# Patient Record
Sex: FEMALE | Race: WHITE | NOT HISPANIC OR LATINO | Employment: FULL TIME | URBAN - METROPOLITAN AREA
[De-identification: names, ages, dates, MRNs, and addresses within clinical notes are randomized per-mention and may not be internally consistent; named-entity substitution may affect disease eponyms.]

---

## 2023-02-25 PROBLEM — R39.9 UTI SYMPTOMS: Status: ACTIVE | Noted: 2023-02-25

## 2023-02-25 PROBLEM — M79.609 POPLITEAL PAIN: Status: ACTIVE | Noted: 2023-02-25

## 2023-02-25 PROBLEM — M25.562 LEFT KNEE PAIN: Status: ACTIVE | Noted: 2023-02-25

## 2023-02-25 PROBLEM — S83.519D: Status: ACTIVE | Noted: 2023-02-25

## 2023-02-25 PROBLEM — S89.92XA INJURY OF LEFT LEG: Status: ACTIVE | Noted: 2023-02-25

## 2023-02-25 PROBLEM — M79.672 LEFT FOOT PAIN: Status: ACTIVE | Noted: 2023-02-25

## 2023-02-25 RX ORDER — NORGESTIMATE AND ETHINYL ESTRADIOL 7DAYSX3 LO
KIT ORAL
COMMUNITY
End: 2023-04-06 | Stop reason: ALTCHOICE

## 2023-04-05 PROBLEM — E78.5 HYPERLIPIDEMIA: Status: ACTIVE | Noted: 2023-04-05

## 2023-04-05 ASSESSMENT — ENCOUNTER SYMPTOMS
SWEATS: 0
PALPITATIONS: 0
HYPERTENSION: 1
SHORTNESS OF BREATH: 0
ORTHOPNEA: 0
NECK PAIN: 0
BLURRED VISION: 0
HEADACHES: 0
PND: 0

## 2023-04-06 ENCOUNTER — OFFICE VISIT (OUTPATIENT)
Dept: PRIMARY CARE | Facility: CLINIC | Age: 23
End: 2023-04-06
Payer: COMMERCIAL

## 2023-04-06 VITALS
BODY MASS INDEX: 35.44 KG/M2 | OXYGEN SATURATION: 97 % | SYSTOLIC BLOOD PRESSURE: 116 MMHG | RESPIRATION RATE: 17 BRPM | WEIGHT: 240 LBS | HEART RATE: 68 BPM | TEMPERATURE: 97.7 F | DIASTOLIC BLOOD PRESSURE: 74 MMHG

## 2023-04-06 DIAGNOSIS — E78.2 MIXED HYPERLIPIDEMIA: ICD-10-CM

## 2023-04-06 DIAGNOSIS — E66.9 OBESITY (BMI 35.0-39.9 WITHOUT COMORBIDITY): ICD-10-CM

## 2023-04-06 DIAGNOSIS — Z30.09 ENCOUNTER FOR OTHER GENERAL COUNSELING OR ADVICE ON CONTRACEPTION: Primary | ICD-10-CM

## 2023-04-06 PROBLEM — Z30.9 CONTRACEPTIVE MANAGEMENT: Status: ACTIVE | Noted: 2023-04-06

## 2023-04-06 PROCEDURE — 99214 OFFICE O/P EST MOD 30 MIN: CPT | Performed by: INTERNAL MEDICINE

## 2023-04-06 PROCEDURE — 3008F BODY MASS INDEX DOCD: CPT | Performed by: INTERNAL MEDICINE

## 2023-04-06 PROCEDURE — 1036F TOBACCO NON-USER: CPT | Performed by: INTERNAL MEDICINE

## 2023-04-06 RX ORDER — NORGESTIMATE AND ETHINYL ESTRADIOL 7DAYSX3 28
1 KIT ORAL DAILY
Qty: 28 TABLET | Refills: 12 | Status: SHIPPED | OUTPATIENT
Start: 2023-04-06 | End: 2024-04-05

## 2023-04-06 ASSESSMENT — ENCOUNTER SYMPTOMS
TROUBLE SWALLOWING: 0
WHEEZING: 0
CONSTITUTIONAL NEGATIVE: 1
DIFFICULTY URINATING: 0
VOICE CHANGE: 0
CARDIOVASCULAR NEGATIVE: 1
SWEATS: 0
EYES NEGATIVE: 1
MYALGIAS: 0
CONSTIPATION: 0
DIARRHEA: 0
CONFUSION: 0
APPETITE CHANGE: 0
PSYCHIATRIC NEGATIVE: 1
HEADACHES: 0
HEMATOLOGIC/LYMPHATIC NEGATIVE: 1
EYE PAIN: 0
BACK PAIN: 0
COLOR CHANGE: 0
PND: 0
POLYDIPSIA: 0
MUSCULOSKELETAL NEGATIVE: 1
SORE THROAT: 0
ARTHRALGIAS: 0
NAUSEA: 0
DEPRESSION: 0
SLEEP DISTURBANCE: 0
SEIZURES: 0
FREQUENCY: 0
FLANK PAIN: 0
EYE REDNESS: 0
HYPERTENSION: 1
SHORTNESS OF BREATH: 0
SINUS PRESSURE: 0
AGITATION: 0
ENDOCRINE NEGATIVE: 1
BRUISES/BLEEDS EASILY: 0
DYSURIA: 0
NEUROLOGICAL NEGATIVE: 1
TREMORS: 0
HALLUCINATIONS: 0
EYE DISCHARGE: 0
VOMITING: 0
BLURRED VISION: 0
RESPIRATORY NEGATIVE: 1
ADENOPATHY: 0
WOUND: 0
COUGH: 0
PALPITATIONS: 0
UNEXPECTED WEIGHT CHANGE: 0
ALLERGIC/IMMUNOLOGIC NEGATIVE: 1
DIZZINESS: 0
WEAKNESS: 0
NECK PAIN: 0
POLYPHAGIA: 0
ORTHOPNEA: 0
ABDOMINAL PAIN: 0
NUMBNESS: 0
NECK STIFFNESS: 0
CHEST TIGHTNESS: 0
BLOOD IN STOOL: 0
JOINT SWELLING: 0
LIGHT-HEADEDNESS: 0
LOSS OF SENSATION IN FEET: 0
ACTIVITY CHANGE: 0
SINUS PAIN: 0
STRIDOR: 0
SPEECH DIFFICULTY: 0
FACIAL ASYMMETRY: 0
NERVOUS/ANXIOUS: 0
GASTROINTESTINAL NEGATIVE: 1
OCCASIONAL FEELINGS OF UNSTEADINESS: 0

## 2023-04-06 ASSESSMENT — PATIENT HEALTH QUESTIONNAIRE - PHQ9
SUM OF ALL RESPONSES TO PHQ9 QUESTIONS 1 AND 2: 0
2. FEELING DOWN, DEPRESSED OR HOPELESS: NOT AT ALL
1. LITTLE INTEREST OR PLEASURE IN DOING THINGS: NOT AT ALL

## 2023-04-06 ASSESSMENT — PAIN SCALES - GENERAL: PAINLEVEL: 0-NO PAIN

## 2023-04-06 NOTE — PROGRESS NOTES
Subjective   Patient ID: Violet M Romanak is a 23 y.o. female who presents for Follow-up (The patient is here for a follow up for weight loss and birth control. ).  Hypertension  This is a recurrent problem. The current episode started more than 1 year ago. The problem has been gradually improving since onset. The problem is controlled. Pertinent negatives include no anxiety, blurred vision, chest pain, headaches, malaise/fatigue, neck pain, orthopnea, palpitations, peripheral edema, PND, shortness of breath or sweats. Agents associated with hypertension include oral contraceptives. Risk factors for coronary artery disease include dyslipidemia, obesity and stress. There are no compliance problems.        Patient has been feeling pretty good and has been complaint with prescribed medications.    We reviewed and discussed details of recent blood work: CBC, CMP, TSH, Lipid panel done in 2022.  Results within acceptable range.  Mildly elevated cholesterol noted.    Her PAP test in Jan 2023 was negative.     She lost 6 lbs.   Saw dietician, has been following healthy diet and exercising daily.  We discussed low carb diet.    Requests refill on BPP.  I gave patient counseling on BCP risks including DVT, PE, not using tobacco products when taking BC due to increased clotting risk.     She was not aware about those, BCP previosly prescribed by GYN.    Review of Systems   Constitutional: Negative.  Negative for activity change, appetite change, malaise/fatigue and unexpected weight change.   HENT: Negative.  Negative for congestion, ear discharge, ear pain, hearing loss, mouth sores, nosebleeds, sinus pressure, sinus pain, sore throat, trouble swallowing and voice change.    Eyes: Negative.  Negative for blurred vision, pain, discharge, redness and visual disturbance.   Respiratory: Negative.  Negative for cough, chest tightness, shortness of breath, wheezing and stridor.    Cardiovascular: Negative.  Negative for chest  pain, palpitations, orthopnea, leg swelling and PND.   Gastrointestinal: Negative.  Negative for abdominal pain, blood in stool, constipation, diarrhea, nausea and vomiting.   Endocrine: Negative.  Negative for polydipsia, polyphagia and polyuria.   Genitourinary: Negative.  Negative for difficulty urinating, dysuria, flank pain, frequency and urgency.   Musculoskeletal: Negative.  Negative for arthralgias, back pain, gait problem, joint swelling, myalgias, neck pain and neck stiffness.   Skin: Negative.  Negative for color change, rash and wound.   Allergic/Immunologic: Negative.  Negative for environmental allergies, food allergies and immunocompromised state.   Neurological: Negative.  Negative for dizziness, tremors, seizures, syncope, facial asymmetry, speech difficulty, weakness, light-headedness, numbness and headaches.   Hematological: Negative.  Negative for adenopathy. Does not bruise/bleed easily.   Psychiatric/Behavioral: Negative.  Negative for agitation, behavioral problems, confusion, hallucinations, sleep disturbance and suicidal ideas. The patient is not nervous/anxious.    All other systems reviewed and are negative.      Objective     Review of systems was performed and all systems were negative except what in HPI    /74 (BP Location: Left arm, Patient Position: Sitting)   Pulse 68   Temp 36.5 °C (97.7 °F)   Resp 17   Wt 109 kg (240 lb)   SpO2 97%   BMI 35.44 kg/m²    Physical Exam  Vitals and nursing note reviewed.   Constitutional:       General: She is not in acute distress.     Appearance: Normal appearance.   HENT:      Head: Normocephalic and atraumatic.      Right Ear: External ear normal.      Left Ear: External ear normal.      Nose: Nose normal. No congestion or rhinorrhea.   Eyes:      General:         Right eye: No discharge.         Left eye: No discharge.      Extraocular Movements: Extraocular movements intact.      Conjunctiva/sclera: Conjunctivae normal.      Pupils:  Pupils are equal, round, and reactive to light.   Cardiovascular:      Rate and Rhythm: Normal rate and regular rhythm.      Pulses: Normal pulses.      Heart sounds: Normal heart sounds. No murmur heard.     No friction rub. No gallop.   Pulmonary:      Effort: Pulmonary effort is normal. No respiratory distress.      Breath sounds: Normal breath sounds. No stridor. No wheezing, rhonchi or rales.   Chest:      Chest wall: No tenderness.   Abdominal:      General: Bowel sounds are normal.      Palpations: Abdomen is soft. There is no mass.      Tenderness: There is no abdominal tenderness. There is no guarding or rebound.   Musculoskeletal:         General: No swelling or deformity. Normal range of motion.      Cervical back: Normal range of motion and neck supple. No rigidity or tenderness.      Right lower leg: No edema.      Left lower leg: No edema.   Lymphadenopathy:      Cervical: No cervical adenopathy.   Skin:     General: Skin is warm and dry.      Coloration: Skin is not jaundiced.      Findings: No bruising or erythema.   Neurological:      General: No focal deficit present.      Mental Status: She is alert and oriented to person, place, and time. Mental status is at baseline.      Cranial Nerves: No cranial nerve deficit.      Motor: No weakness.      Coordination: Coordination normal.      Gait: Gait normal.   Psychiatric:         Mood and Affect: Mood normal.         Behavior: Behavior normal.         Thought Content: Thought content normal.         Judgment: Judgment normal.         Assessment/Plan   Problem List Items Addressed This Visit          Endocrine/Metabolic    Obesity (BMI 35.0-39.9 without comorbidity)     Weight loss is advised. Target BMI: 25. Please follow low carbohydrate diet and exercise at least 150 minutes weekly.  Nutritional consultation is available, please let me know if interested.             Other    Hyperlipidemia     Low cholesterol diet is advised.          Contraceptive  management - Primary    Relevant Medications    norgestimate-ethinyl estradioL (Ortho Tri-Cyclen,Trinessa) 0.18/0.215/0.25 mg-35 mcg (28) tablet     It was a pleasure to see you today.  I would like to remind you about importance of a healthy lifestyle in order to improve your well-being and live longer.  Try to engage in physical activities for at least 150 minutes per week.  Eat about 10 servings of fruits and vegetables daily. My advice is 2 servings of fruits and 8 servings of vegetables.  For vegetables choose at least half of them green and at least half of them fresh.  Please avoid sugar, salt, fried food and saturated fat.    I spent a total of 30 minutes on the date of service which included preparing to see the patient, face-to-face patient care, completing clinical documentation, obtaining and/or reviewing separately obtained history, performing a medically appropriate examination, counseling and educating the patient/family/caregiver, ordering medications, tests, or procedures, communicating with other health care providers (not separately reported), independently interpreting results (not separately reported), communicating results to the patient/family/caregiver, and care coordination (not separately reported).    F/up in 6 months or sooner if needed.

## 2023-04-07 ENCOUNTER — TELEPHONE (OUTPATIENT)
Dept: PRIMARY CARE | Facility: CLINIC | Age: 23
End: 2023-04-07
Payer: COMMERCIAL

## 2023-04-07 NOTE — TELEPHONE ENCOUNTER
Pt saw Dr. Bhat yesterday and forgot to mention she is going to [place] in about a month and wanted to know if there are any vaccines she will need to get. Please advise

## 2023-04-27 ENCOUNTER — CLINICAL SUPPORT (OUTPATIENT)
Dept: PRIMARY CARE | Facility: CLINIC | Age: 23
End: 2023-04-27
Payer: COMMERCIAL

## 2023-04-27 DIAGNOSIS — Z23 IMMUNIZATION DUE: ICD-10-CM

## 2023-04-27 PROCEDURE — 90715 TDAP VACCINE 7 YRS/> IM: CPT | Performed by: INTERNAL MEDICINE

## 2023-04-27 PROCEDURE — 90471 IMMUNIZATION ADMIN: CPT | Performed by: INTERNAL MEDICINE

## 2023-10-06 ENCOUNTER — OFFICE VISIT (OUTPATIENT)
Dept: PRIMARY CARE | Facility: CLINIC | Age: 23
End: 2023-10-06
Payer: COMMERCIAL

## 2023-10-06 VITALS
HEART RATE: 78 BPM | SYSTOLIC BLOOD PRESSURE: 126 MMHG | WEIGHT: 233 LBS | RESPIRATION RATE: 16 BRPM | TEMPERATURE: 97.3 F | DIASTOLIC BLOOD PRESSURE: 86 MMHG | OXYGEN SATURATION: 98 % | BODY MASS INDEX: 34.51 KG/M2 | HEIGHT: 69 IN

## 2023-10-06 DIAGNOSIS — Z00.00 HEALTHCARE MAINTENANCE: ICD-10-CM

## 2023-10-06 DIAGNOSIS — Z30.09 ENCOUNTER FOR OTHER GENERAL COUNSELING OR ADVICE ON CONTRACEPTION: ICD-10-CM

## 2023-10-06 DIAGNOSIS — E66.09 CLASS 1 OBESITY DUE TO EXCESS CALORIES WITHOUT SERIOUS COMORBIDITY WITH BODY MASS INDEX (BMI) OF 34.0 TO 34.9 IN ADULT: ICD-10-CM

## 2023-10-06 DIAGNOSIS — E78.2 MIXED HYPERLIPIDEMIA: ICD-10-CM

## 2023-10-06 DIAGNOSIS — Z20.2 POSSIBLE EXPOSURE TO STD: Primary | ICD-10-CM

## 2023-10-06 PROBLEM — E66.811 CLASS 1 OBESITY DUE TO EXCESS CALORIES WITHOUT SERIOUS COMORBIDITY WITH BODY MASS INDEX (BMI) OF 34.0 TO 34.9 IN ADULT: Status: ACTIVE | Noted: 2023-10-06

## 2023-10-06 PROCEDURE — 1036F TOBACCO NON-USER: CPT | Performed by: INTERNAL MEDICINE

## 2023-10-06 PROCEDURE — 99214 OFFICE O/P EST MOD 30 MIN: CPT | Performed by: INTERNAL MEDICINE

## 2023-10-06 PROCEDURE — 3008F BODY MASS INDEX DOCD: CPT | Performed by: INTERNAL MEDICINE

## 2023-10-06 ASSESSMENT — ENCOUNTER SYMPTOMS
COUGH: 0
LIGHT-HEADEDNESS: 0
ABDOMINAL PAIN: 0
POLYPHAGIA: 0
SLEEP DISTURBANCE: 0
HALLUCINATIONS: 0
BACK PAIN: 0
VOICE CHANGE: 0
PALPITATIONS: 0
ACTIVITY CHANGE: 0
SINUS PAIN: 0
NEUROLOGICAL NEGATIVE: 1
NUMBNESS: 0
DYSURIA: 0
DIARRHEA: 0
SORE THROAT: 0
WEAKNESS: 0
FLANK PAIN: 0
EYE DISCHARGE: 0
APPETITE CHANGE: 0
SINUS PRESSURE: 0
CONSTITUTIONAL NEGATIVE: 1
JOINT SWELLING: 0
ARTHRALGIAS: 0
FACIAL ASYMMETRY: 0
FREQUENCY: 0
EYES NEGATIVE: 1
DEPRESSION: 0
COLOR CHANGE: 0
RESPIRATORY NEGATIVE: 1
SHORTNESS OF BREATH: 0
WOUND: 0
MUSCULOSKELETAL NEGATIVE: 1
HEADACHES: 0
TROUBLE SWALLOWING: 0
CONSTIPATION: 0
EYE PAIN: 0
ENDOCRINE NEGATIVE: 1
CARDIOVASCULAR NEGATIVE: 1
GASTROINTESTINAL NEGATIVE: 1
NECK STIFFNESS: 0
DIZZINESS: 0
CONFUSION: 0
SPEECH DIFFICULTY: 0
OCCASIONAL FEELINGS OF UNSTEADINESS: 0
HEMATOLOGIC/LYMPHATIC NEGATIVE: 1
DIFFICULTY URINATING: 0
AGITATION: 0
BRUISES/BLEEDS EASILY: 0
ALLERGIC/IMMUNOLOGIC NEGATIVE: 1
NAUSEA: 0
VOMITING: 0
NERVOUS/ANXIOUS: 0
EYE REDNESS: 0
PSYCHIATRIC NEGATIVE: 1
UNEXPECTED WEIGHT CHANGE: 0
NECK PAIN: 0
STRIDOR: 0
TREMORS: 0
SEIZURES: 0
MYALGIAS: 0
BLOOD IN STOOL: 0
LOSS OF SENSATION IN FEET: 0
WHEEZING: 0
POLYDIPSIA: 0
ADENOPATHY: 0
CHEST TIGHTNESS: 0

## 2023-10-06 ASSESSMENT — PATIENT HEALTH QUESTIONNAIRE - PHQ9
SUM OF ALL RESPONSES TO PHQ9 QUESTIONS 1 AND 2: 0
1. LITTLE INTEREST OR PLEASURE IN DOING THINGS: NOT AT ALL
2. FEELING DOWN, DEPRESSED OR HOPELESS: NOT AT ALL

## 2023-10-06 NOTE — PROGRESS NOTES
Subjective   Patient ID: Violet Romanak is a 23 y.o. female who presents for Follow-up (Patient is here for a follow up./Wants to discuss weight and STD testing. ).  HPI    Patient has been feeling pretty good and has been complaint with prescribed medications.  Saw dietician, has been following low carbohydrate diet.  Lost 7 lbs.  We discussed enrolling in weight loss program preferably without pharmacological tx (e.g. Noom).    She has new sexual partner, would like to be tested for STD's. At this time asymptomatic.    Patient wonders if BCP may contributed to weight gain and interfere with her weight loss efforts, advised to discuss further with GYN.    Review of Systems   Constitutional: Negative.  Negative for activity change, appetite change and unexpected weight change.   HENT: Negative.  Negative for congestion, ear discharge, ear pain, hearing loss, mouth sores, nosebleeds, sinus pressure, sinus pain, sore throat, trouble swallowing and voice change.    Eyes: Negative.  Negative for pain, discharge, redness and visual disturbance.   Respiratory: Negative.  Negative for cough, chest tightness, shortness of breath, wheezing and stridor.    Cardiovascular: Negative.  Negative for chest pain, palpitations and leg swelling.   Gastrointestinal: Negative.  Negative for abdominal pain, blood in stool, constipation, diarrhea, nausea and vomiting.   Endocrine: Negative.  Negative for polydipsia, polyphagia and polyuria.   Genitourinary: Negative.  Negative for difficulty urinating, dysuria, flank pain, frequency and urgency.   Musculoskeletal: Negative.  Negative for arthralgias, back pain, gait problem, joint swelling, myalgias, neck pain and neck stiffness.   Skin: Negative.  Negative for color change, rash and wound.   Allergic/Immunologic: Negative.  Negative for environmental allergies, food allergies and immunocompromised state.   Neurological: Negative.  Negative for dizziness, tremors, seizures, syncope,  "facial asymmetry, speech difficulty, weakness, light-headedness, numbness and headaches.   Hematological: Negative.  Negative for adenopathy. Does not bruise/bleed easily.   Psychiatric/Behavioral: Negative.  Negative for agitation, behavioral problems, confusion, hallucinations, sleep disturbance and suicidal ideas. The patient is not nervous/anxious.    All other systems reviewed and are negative.      Objective     Review of systems was performed and all systems were negative except what in HPI    /86   Pulse 78   Temp 36.3 °C (97.3 °F)   Resp 16   Ht 1.753 m (5' 9\")   Wt 106 kg (233 lb)   SpO2 98%   BMI 34.41 kg/m²    Physical Exam  Vitals and nursing note reviewed.   Constitutional:       General: She is not in acute distress.     Appearance: Normal appearance.   HENT:      Head: Normocephalic and atraumatic.      Right Ear: External ear normal.      Left Ear: External ear normal.      Nose: Nose normal. No congestion or rhinorrhea.   Eyes:      General:         Right eye: No discharge.         Left eye: No discharge.      Extraocular Movements: Extraocular movements intact.      Conjunctiva/sclera: Conjunctivae normal.      Pupils: Pupils are equal, round, and reactive to light.   Cardiovascular:      Rate and Rhythm: Normal rate and regular rhythm.      Pulses: Normal pulses.      Heart sounds: Normal heart sounds. No murmur heard.     No friction rub. No gallop.   Pulmonary:      Effort: Pulmonary effort is normal. No respiratory distress.      Breath sounds: Normal breath sounds. No stridor. No wheezing, rhonchi or rales.   Chest:      Chest wall: No tenderness.   Abdominal:      General: Bowel sounds are normal.      Palpations: Abdomen is soft. There is no mass.      Tenderness: There is no abdominal tenderness. There is no guarding or rebound.   Musculoskeletal:         General: No swelling or deformity. Normal range of motion.      Cervical back: Normal range of motion and neck supple. No " rigidity or tenderness.      Right lower leg: No edema.      Left lower leg: No edema.   Lymphadenopathy:      Cervical: No cervical adenopathy.   Skin:     General: Skin is warm and dry.      Coloration: Skin is not jaundiced.      Findings: No bruising or erythema.   Neurological:      General: No focal deficit present.      Mental Status: She is alert and oriented to person, place, and time. Mental status is at baseline.      Cranial Nerves: No cranial nerve deficit.      Motor: No weakness.      Coordination: Coordination normal.      Gait: Gait normal.   Psychiatric:         Mood and Affect: Mood normal.         Behavior: Behavior normal.         Thought Content: Thought content normal.         Judgment: Judgment normal.         Assessment/Plan   Problem List Items Addressed This Visit             ICD-10-CM       Cardiac and Vasculature    Hyperlipidemia E78.5     Low cholesterol diet is advised.             Endocrine/Metabolic    Class 1 obesity due to excess calories without serious comorbidity with body mass index (BMI) of 34.0 to 34.9 in adult E66.09, Z68.34     Weight loss is advised. Target BMI: 25. Please follow low carbohydrate diet and exercise at least 150 minutes weekly.  Nutritional consultation is available, please let me know if interested.             Genitourinary and Reproductive    Contraceptive management Z30.9     F/up with GYN. Please discuss with GYN BCP options with minimal or no influence on weight.             Infectious Diseases    Possible exposure to STD - Primary Z20.2    Relevant Orders    C. Trachomatis / N. Gonorrhoeae, Amplified Detection    Syphilis Screen with Reflex    Hepatitis Panel, Acute    HIV 1/2 Antigen/Antibody Screen with Reflex to Confirmation     Other Visit Diagnoses         Codes    Healthcare maintenance     Z00.00    Relevant Orders    CBC    Comprehensive Metabolic Panel    Lipid Panel    TSH with reflex to Free T4 if abnormal    Urinalysis with Reflex  Microscopic          It was a pleasure to see you today.  I would like to remind you about importance of a healthy lifestyle in order to improve your well-being and live longer.  Try to engage in physical activities for at least 150 minutes per week.  Eat about 10 servings of fruits and vegetables daily. My advice is 2 servings of fruits and 8 servings of vegetables.  For vegetables choose at least half of them green and at least half of them fresh.  Please avoid sugar, salt, fried food and saturated fat.    I spent a total of 30 minutes on the date of service for follow up visit, which included preparing to see the patient, face-to-face patient care, completing clinical documentation, obtaining and/or reviewing separately obtained history, performing a medically appropriate examination, counseling and educating the patient/family/caregiver, ordering medications, tests, or procedures, communicating with other health care providers (not separately reported), independently interpreting results (not separately reported), communicating results to the patient/family/caregiver, and care coordination (not separately reported).    F/up in 6 months for CPE or sooner if needed.

## 2023-10-14 ENCOUNTER — LAB (OUTPATIENT)
Dept: LAB | Facility: LAB | Age: 23
End: 2023-10-14
Payer: COMMERCIAL

## 2023-10-14 DIAGNOSIS — Z00.00 HEALTHCARE MAINTENANCE: ICD-10-CM

## 2023-10-14 DIAGNOSIS — Z20.2 POSSIBLE EXPOSURE TO STD: ICD-10-CM

## 2023-10-14 LAB
ALBUMIN SERPL BCP-MCNC: 4.6 G/DL (ref 3.4–5)
ALP SERPL-CCNC: 69 U/L (ref 33–110)
ALT SERPL W P-5'-P-CCNC: 29 U/L (ref 7–45)
ANION GAP SERPL CALC-SCNC: 11 MMOL/L (ref 10–20)
APPEARANCE UR: ABNORMAL
AST SERPL W P-5'-P-CCNC: 23 U/L (ref 9–39)
BACTERIA #/AREA URNS AUTO: ABNORMAL /HPF
BILIRUB SERPL-MCNC: 0.6 MG/DL (ref 0–1.2)
BILIRUB UR STRIP.AUTO-MCNC: NEGATIVE MG/DL
BUN SERPL-MCNC: 16 MG/DL (ref 6–23)
CALCIUM SERPL-MCNC: 10.1 MG/DL (ref 8.6–10.3)
CHLORIDE SERPL-SCNC: 102 MMOL/L (ref 98–107)
CHOLEST SERPL-MCNC: 234 MG/DL (ref 0–199)
CHOLESTEROL/HDL RATIO: 2.9
CO2 SERPL-SCNC: 30 MMOL/L (ref 21–32)
COLOR UR: YELLOW
CREAT SERPL-MCNC: 0.83 MG/DL (ref 0.5–1.05)
ERYTHROCYTE [DISTWIDTH] IN BLOOD BY AUTOMATED COUNT: 12 % (ref 11.5–14.5)
GFR SERPL CREATININE-BSD FRML MDRD: >90 ML/MIN/1.73M*2
GLUCOSE SERPL-MCNC: 82 MG/DL (ref 74–99)
GLUCOSE UR STRIP.AUTO-MCNC: NEGATIVE MG/DL
HAV IGM SER QL: NONREACTIVE
HBV CORE IGM SER QL: NONREACTIVE
HBV SURFACE AG SERPL QL IA: NONREACTIVE
HCT VFR BLD AUTO: 46 % (ref 36–46)
HCV AB SER QL: NONREACTIVE
HDLC SERPL-MCNC: 79.6 MG/DL
HGB BLD-MCNC: 14.9 G/DL (ref 12–16)
HIV 1+2 AB+HIV1 P24 AG SERPL QL IA: NONREACTIVE
KETONES UR STRIP.AUTO-MCNC: NEGATIVE MG/DL
LDLC SERPL CALC-MCNC: 133 MG/DL
LEUKOCYTE ESTERASE UR QL STRIP.AUTO: NEGATIVE
MCH RBC QN AUTO: 30.2 PG (ref 26–34)
MCHC RBC AUTO-ENTMCNC: 32.4 G/DL (ref 32–36)
MCV RBC AUTO: 93 FL (ref 80–100)
MUCOUS THREADS #/AREA URNS AUTO: ABNORMAL /LPF
NITRITE UR QL STRIP.AUTO: NEGATIVE
NON HDL CHOLESTEROL: 154 MG/DL (ref 0–149)
NRBC BLD-RTO: 0 /100 WBCS (ref 0–0)
PH UR STRIP.AUTO: 6 [PH]
PLATELET # BLD AUTO: 283 X10*3/UL (ref 150–450)
PMV BLD AUTO: 9.8 FL (ref 7.5–11.5)
POTASSIUM SERPL-SCNC: 4.2 MMOL/L (ref 3.5–5.3)
PROT SERPL-MCNC: 7.4 G/DL (ref 6.4–8.2)
PROT UR STRIP.AUTO-MCNC: ABNORMAL MG/DL
RBC # BLD AUTO: 4.94 X10*6/UL (ref 4–5.2)
RBC # UR STRIP.AUTO: NEGATIVE /UL
RBC #/AREA URNS AUTO: ABNORMAL /HPF
SODIUM SERPL-SCNC: 139 MMOL/L (ref 136–145)
SP GR UR STRIP.AUTO: 1.03
SQUAMOUS #/AREA URNS AUTO: ABNORMAL /HPF
TRANS CELLS #/AREA UR COMP ASSIST: ABNORMAL /HPF
TRIGL SERPL-MCNC: 105 MG/DL (ref 0–149)
TSH SERPL-ACNC: 1.76 MIU/L (ref 0.44–3.98)
UROBILINOGEN UR STRIP.AUTO-MCNC: <2 MG/DL
VLDL: 21 MG/DL (ref 0–40)
WBC # BLD AUTO: 6.1 X10*3/UL (ref 4.4–11.3)
WBC #/AREA URNS AUTO: ABNORMAL /HPF

## 2023-10-14 PROCEDURE — 80061 LIPID PANEL: CPT

## 2023-10-14 PROCEDURE — 87389 HIV-1 AG W/HIV-1&-2 AB AG IA: CPT

## 2023-10-14 PROCEDURE — 80053 COMPREHEN METABOLIC PANEL: CPT

## 2023-10-14 PROCEDURE — 86780 TREPONEMA PALLIDUM: CPT

## 2023-10-14 PROCEDURE — 36415 COLL VENOUS BLD VENIPUNCTURE: CPT

## 2023-10-14 PROCEDURE — 80074 ACUTE HEPATITIS PANEL: CPT

## 2023-10-14 PROCEDURE — 84443 ASSAY THYROID STIM HORMONE: CPT

## 2023-10-14 PROCEDURE — 87800 DETECT AGNT MULT DNA DIREC: CPT

## 2023-10-14 PROCEDURE — 81001 URINALYSIS AUTO W/SCOPE: CPT

## 2023-10-14 PROCEDURE — 85027 COMPLETE CBC AUTOMATED: CPT

## 2023-10-15 DIAGNOSIS — N39.0 URINARY TRACT INFECTION WITHOUT HEMATURIA, SITE UNSPECIFIED: Primary | ICD-10-CM

## 2023-10-15 LAB
C TRACH RRNA SPEC QL NAA+PROBE: NEGATIVE
N GONORRHOEA DNA SPEC QL PROBE+SIG AMP: NEGATIVE
T PALLIDUM AB SER QL: NONREACTIVE

## 2023-10-19 ENCOUNTER — OFFICE VISIT (OUTPATIENT)
Dept: GASTROENTEROLOGY | Facility: CLINIC | Age: 23
End: 2023-10-19
Payer: COMMERCIAL

## 2023-10-19 VITALS
BODY MASS INDEX: 35.25 KG/M2 | HEART RATE: 76 BPM | WEIGHT: 238 LBS | HEIGHT: 69 IN | SYSTOLIC BLOOD PRESSURE: 117 MMHG | DIASTOLIC BLOOD PRESSURE: 76 MMHG

## 2023-10-19 DIAGNOSIS — K62.5 RECTAL BLEEDING: ICD-10-CM

## 2023-10-19 DIAGNOSIS — K64.9 ACUTE HEMORRHOID: Primary | ICD-10-CM

## 2023-10-19 PROCEDURE — 3008F BODY MASS INDEX DOCD: CPT | Performed by: REGISTERED NURSE

## 2023-10-19 PROCEDURE — 1036F TOBACCO NON-USER: CPT | Performed by: REGISTERED NURSE

## 2023-10-19 PROCEDURE — 99203 OFFICE O/P NEW LOW 30 MIN: CPT | Performed by: REGISTERED NURSE

## 2023-10-19 RX ORDER — SODIUM, POTASSIUM,MAG SULFATES 17.5-3.13G
1 SOLUTION, RECONSTITUTED, ORAL ORAL SEE ADMIN INSTRUCTIONS
Qty: 2 EACH | Refills: 0 | Status: SHIPPED | OUTPATIENT
Start: 2023-10-19 | End: 2023-11-17 | Stop reason: ALTCHOICE

## 2023-10-19 ASSESSMENT — ENCOUNTER SYMPTOMS
ANAL BLEEDING: 1
ENDOCRINE NEGATIVE: 1
ARTHRALGIAS: 0
BLOOD IN STOOL: 1
JOINT SWELLING: 0
EYE PAIN: 0
MYALGIAS: 0
RECTAL PAIN: 1
COUGH: 0
EYES NEGATIVE: 1
NERVOUS/ANXIOUS: 0
UNEXPECTED WEIGHT CHANGE: 0
DIFFICULTY URINATING: 0
SHORTNESS OF BREATH: 0
APPETITE CHANGE: 0

## 2023-10-19 NOTE — PROGRESS NOTES
REASON FOR VISIT:  NPV self referral for possible fissure. Patient c/o rectal pain and blood approximately 3 months ago, thought it resolved and then last week she noticed blood x3 days. Moves bowels 1x daily. Denies NVD. Never had a colonoscopy.      HPI:  Violet Romanak is a 23 y.o. female who presents for intermittent rectal bleeding x 3 months.  Blood on TP and in toilet.  Endorses rectal burning and felt something on her bottom.  Alternating constipation and diarrhea.     Normal bowel pattern - once a day.  Recently more constipated- stool is hard, soft, but not watery.  Tried Metamucil gummies - tends  to help slightly. Eats a low residue diet.     Denies abdominal pain, nausea, vomiting, diarrhea, urgency, nocturnal bowel movements,  melena, reflux, dysphagia, epigastric pain, or bloating.  Weight and appetite are stable.     Med list notable for- Denies NSAID use     Labs notable for-  10/23 - normal CBC. CMP, TSH     No fhx of CRC.     Prev endoscopic eval:     Colonoscopy- never   EGD- never     REVIEW OF SYSTEMS    Review of Systems   Constitutional:  Negative for appetite change and unexpected weight change.   HENT:  Negative for mouth sores.    Eyes: Negative.  Negative for pain and visual disturbance.   Respiratory:  Negative for cough and shortness of breath.    Cardiovascular:  Negative for chest pain.   Gastrointestinal:  Positive for anal bleeding, blood in stool and rectal pain.   Endocrine: Negative.    Genitourinary:  Negative for difficulty urinating.   Musculoskeletal:  Negative for arthralgias, joint swelling and myalgias.   Skin:  Negative for rash.   Allergic/Immunologic: Negative for environmental allergies and food allergies.   Psychiatric/Behavioral:  The patient is not nervous/anxious.           Allergies   Allergen Reactions    Vancomycin Anaphylaxis       Past Medical History:   Diagnosis Date    Diphtheria, tetanus, acellular pertussis, and inactivated poliovirus vaccine (DTaP-IPV)  administered 2000    DTaP/IPV/HBV vaccination 2000    DTaP/IPV/HBV vaccination 2000    DTaP/IPV/HBV vaccination 02/27/2003    Hepatitis B 2000    Hepatitis B 2000    Hepatitis B 03/22/2001    History of HIB vaccination 2000    History of HIB vaccination 2000    History of HIB vaccination 03/22/2001    History of tetanus, diphtheria, and acellular pertussis booster vaccination (Tdap) 02/29/2012    Influenza 12/22/2014    Meningococcal group B vaccine administered 02/22/2018    Meningococcal group B vaccine administered 02/22/2018    Other specified health status     Known health problems: none    Pneumococcal vaccine administered 01/18/2001    Pneumococcal vaccine administered 02/22/2001    Pneumococcal vaccine administered 04/26/2001    Unspecified symptoms and signs involving the genitourinary system 08/01/2020    UTI symptoms    Vaccine for human papilloma virus (HPV) types 6, 11, 16, and 18 administered 05/29/2012    Vaccine for human papilloma virus (HPV) types 6, 11, 16, and 18 administered 12/02/2014    Varicella vaccine 12/22/2014    Varicella vaccine 02/22/2018       Past Surgical History:   Procedure Laterality Date    OTHER SURGICAL HISTORY  08/01/2020    Dubois tooth extraction    OTHER SURGICAL HISTORY  01/13/2022    Anterior cruciate ligament repair       Family History   Problem Relation Name Age of Onset    No Known Problems Mother      Breast cancer Other G/P     No Known Problems Other FAM HX        Social History     Tobacco Use    Smoking status: Never     Passive exposure: Never    Smokeless tobacco: Never   Substance Use Topics    Alcohol use: Not Currently     Alcohol/week: 3.0 standard drinks of alcohol     Types: 1 Glasses of wine, 1 Cans of beer, 1 Shots of liquor per week       Current Outpatient Medications   Medication Sig Dispense Refill    norgestimate-ethinyl estradioL (Ortho Tri-Cyclen,Trinessa) 0.18/0.215/0.25 mg-35 mcg (28) tablet Take 1  "tablet by mouth once daily. 28 tablet 12     No current facility-administered medications for this visit.       PHYSICAL EXAM:  /76 (BP Location: Right arm, Patient Position: Sitting, BP Cuff Size: Large adult)   Pulse 76   Ht 1.753 m (5' 9\")   Wt 108 kg (238 lb)   BMI 35.15 kg/m²      Physical Exam  Constitutional:       Appearance: Normal appearance.   HENT:      Head: Normocephalic and atraumatic.      Nose: Nose normal.   Eyes:      Pupils: Pupils are equal, round, and reactive to light.   Cardiovascular:      Rate and Rhythm: Normal rate and regular rhythm.   Pulmonary:      Effort: Pulmonary effort is normal.      Breath sounds: Normal breath sounds.   Abdominal:      General: Abdomen is flat. Bowel sounds are normal. There is no distension.      Palpations: Abdomen is soft. There is no mass.      Tenderness: There is no abdominal tenderness. There is no guarding or rebound.      Hernia: No hernia is present.   Genitourinary:     Rectum: Normal.      Comments: Non-thrombosed ext hemorrhoid, no masses, no fissure.  No blood on digital rectal exam   Musculoskeletal:         General: Normal range of motion.      Cervical back: Normal range of motion and neck supple.   Skin:     General: Skin is warm and dry.   Neurological:      Mental Status: She is alert and oriented to person, place, and time.   Psychiatric:         Mood and Affect: Mood normal.         Behavior: Behavior normal.          ASSESSMENT    Here for evaluation of intermittent rectal bleeding for 3 months.    History of constipation    PLAN    Schedule Colonoscopy.  My office will send in a prescription for a bowel prep for you to your pharmacy.  Please follow the prep instructions closely.  You may have clear liquids only the day before the procedure. You cannot have anything red or purple. You may have popsicles, hard candy or gum.  You will need a .  You will receive sedation for the procedure by an an anesthesia professional to " keep you comfortable during the procedure.      2. Fiber can help to regulate your bowels. It is good for diarrhea or constipation. Begin Fiber supplement, such as, Metamucil or Citrucel daily. Mix one teaspoon in 8 oz of water and drink each day for 7 days.  Week two -  mix two teaspoons in 8 oz of water and drink daily for 7 days. Week 3 - mix three teaspoons ( one tablespoon) in 8 oz of water and stay at that dose.  If you are bloated then decrease the dose.  Use fiber every day for 4 weeks.  It can help to regulate your bowels.  If it helps then keep using it every day.     3.  You can continue to use Metamucil fiber Gummies or try Metamucil wafers if you prefer  4.  Increase dietary fiber intake to 20 to 30 g of fiber spread throughout the day.  Increase water intake to 40 to 60 ounces of water daily  5.  Follow-up in the GI clinic after the procedure or per physician recommendation  .MYH  6.  Hemorrhoid-you can soak your bottom in warm water for 10 minutes twice a day.  You can also purchase over-the-counter Preparation H or 1% rectal cream if you have itching and burning.  You can also use witch hazel to the area which will soothe the area.                Date: 10/19/2023  Time: 8:00 AM
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
## 2023-10-23 ENCOUNTER — LAB (OUTPATIENT)
Dept: LAB | Facility: LAB | Age: 23
End: 2023-10-23
Payer: COMMERCIAL

## 2023-10-23 DIAGNOSIS — N39.0 URINARY TRACT INFECTION WITHOUT HEMATURIA, SITE UNSPECIFIED: ICD-10-CM

## 2023-10-23 LAB
APPEARANCE UR: CLEAR
BILIRUB UR STRIP.AUTO-MCNC: NEGATIVE MG/DL
COLOR UR: NORMAL
GLUCOSE UR STRIP.AUTO-MCNC: NEGATIVE MG/DL
KETONES UR STRIP.AUTO-MCNC: NEGATIVE MG/DL
LEUKOCYTE ESTERASE UR QL STRIP.AUTO: NEGATIVE
NITRITE UR QL STRIP.AUTO: NEGATIVE
PH UR STRIP.AUTO: 6 [PH]
PROT UR STRIP.AUTO-MCNC: NEGATIVE MG/DL
RBC # UR STRIP.AUTO: NEGATIVE /UL
SP GR UR STRIP.AUTO: 1.01
UROBILINOGEN UR STRIP.AUTO-MCNC: <2 MG/DL

## 2023-10-23 PROCEDURE — 81003 URINALYSIS AUTO W/O SCOPE: CPT

## 2023-10-26 ENCOUNTER — ANCILLARY PROCEDURE (OUTPATIENT)
Dept: RADIOLOGY | Facility: CLINIC | Age: 23
End: 2023-10-26
Payer: COMMERCIAL

## 2023-10-26 ENCOUNTER — OFFICE VISIT (OUTPATIENT)
Dept: ORTHOPEDIC SURGERY | Facility: CLINIC | Age: 23
End: 2023-10-26
Payer: COMMERCIAL

## 2023-10-26 VITALS — BODY MASS INDEX: 34.36 KG/M2 | HEIGHT: 69 IN | WEIGHT: 232 LBS

## 2023-10-26 DIAGNOSIS — M25.562 ACUTE PAIN OF LEFT KNEE: ICD-10-CM

## 2023-10-26 DIAGNOSIS — S83.282A ACUTE LATERAL MENISCUS TEAR OF LEFT KNEE, INITIAL ENCOUNTER: Primary | ICD-10-CM

## 2023-10-26 PROCEDURE — 1036F TOBACCO NON-USER: CPT | Performed by: ORTHOPAEDIC SURGERY

## 2023-10-26 PROCEDURE — 3008F BODY MASS INDEX DOCD: CPT | Performed by: ORTHOPAEDIC SURGERY

## 2023-10-26 PROCEDURE — 73560 X-RAY EXAM OF KNEE 1 OR 2: CPT | Mod: LT

## 2023-10-26 PROCEDURE — 73560 X-RAY EXAM OF KNEE 1 OR 2: CPT | Mod: LEFT SIDE | Performed by: ORTHOPAEDIC SURGERY

## 2023-10-26 PROCEDURE — 99203 OFFICE O/P NEW LOW 30 MIN: CPT | Performed by: ORTHOPAEDIC SURGERY

## 2023-10-26 PROCEDURE — 99213 OFFICE O/P EST LOW 20 MIN: CPT | Performed by: ORTHOPAEDIC SURGERY

## 2023-10-26 ASSESSMENT — PAIN - FUNCTIONAL ASSESSMENT: PAIN_FUNCTIONAL_ASSESSMENT: 0-10

## 2023-10-26 ASSESSMENT — PATIENT HEALTH QUESTIONNAIRE - PHQ9
1. LITTLE INTEREST OR PLEASURE IN DOING THINGS: NOT AT ALL
SUM OF ALL RESPONSES TO PHQ9 QUESTIONS 1 AND 2: 0
2. FEELING DOWN, DEPRESSED OR HOPELESS: NOT AT ALL

## 2023-10-26 ASSESSMENT — PAIN SCALES - GENERAL: PAINLEVEL_OUTOF10: 0 - NO PAIN

## 2023-10-26 NOTE — PROGRESS NOTES
History of present: Patient with a history of left ACL reconstruction 2 years ago while at [facility]    She did well    Over the past couple weeks she developed some popping catching clicking    She had a known meniscus injury as well.  Despite rest activity modification stretching exercises now considerable mechanical symptoms now suspected recurrent meniscal tear and seen in the office for evaluation        Past medical history:    The patient's past medical history, family history, social history and review of systems were documented on the patient's medical intake form.  The medical intake form was reviewed and scanned into the electronic medical record for future use.  History is otherwise negative except as stated in the history of present illness.    Physical exam:    General: Alert and oriented to person place and time.  No acute distress and breathing comfortably, pleasant and cooperative with examination.    Head and neck exam: Head is normocephalic atraumatic.    Neck: Supple no visible swelling or deformity.    Cardiovascular: Good perfusion to affected extremities without signs or symptoms of chest pain.    Lungs no audible wheezing or labored breathing on examination.    Abdominal exam: Nondistended nontender    Extremities: The affected left knee was examined and inspected.  There was tenderness to touch along the posterior lateral aspect of the knee with catching and locking mechanical symptoms.  The skin was intact without breakdown or open wound.    There was a positive Razia exam seen without evidence of gross instability in the collateral ligaments or in the anterior posterior plane.  There was a negative Lachman's pivot shift and posterior drawer test.  There was no foot drop or neurovascular change or numbness or tingling.  Sensation and reflexes in the foot and ankle are present and preserved.  There was an effusion.    Range of motion showed good straight leg raise with flexion  to 160 degrees and extension to 0 degrees.  The patient had the ability to bear weight but with discomfort.  The patient since gait was antalgic secondary to discomfort        Diagnostic studies: X-rays show well-positioned femoral-tibial joint space on x-ray status post ACL reconstruction with Endobutton technique      Impression: Left knee recurrent meniscus tear status post ACL reconstruction 2 years ago      Plan: MRI is mandatory for surgical planning    Ice elevate range of motion program continue to do quad strengthening    Anti-inflammatories if appropriate    No indication for bracing at this time as she is stable on endpoint examination but has significant Razia symptoms and popping presumed meniscal tear we will see her back after MRI

## 2023-11-02 ENCOUNTER — ANESTHESIA EVENT (OUTPATIENT)
Dept: GASTROENTEROLOGY | Facility: EXTERNAL LOCATION | Age: 23
End: 2023-11-02

## 2023-11-02 NOTE — ANESTHESIA PREPROCEDURE EVALUATION
Patient: Violet Romanak    Procedure Information       Date/Time: 11/17/23 1230    Scheduled providers: Ricco Benavides MD    Procedure: COLONOSCOPY    Location: [facility]            Relevant Problems   Cardiovascular   (+) Hyperlipidemia      Endocrine   (+) Class 1 obesity due to excess calories without serious comorbidity with body mass index (BMI) of 34.0 to 34.9 in adult       Clinical information reviewed:                   NPO Detail:  No data recorded     Physical Exam    Airway  Mallampati: II  TM distance: >3 FB  Neck ROM: full     Cardiovascular - normal exam     Dental - normal exam     Pulmonary - normal exam     Abdominal   (+) obese           Anesthesia Plan    ASA 2     MAC     intravenous induction   Anesthetic plan and risks discussed with patient.

## 2023-11-13 ENCOUNTER — ANCILLARY PROCEDURE (OUTPATIENT)
Dept: RADIOLOGY | Facility: CLINIC | Age: 23
End: 2023-11-13
Payer: COMMERCIAL

## 2023-11-13 DIAGNOSIS — S83.282A ACUTE LATERAL MENISCUS TEAR OF LEFT KNEE, INITIAL ENCOUNTER: ICD-10-CM

## 2023-11-13 PROCEDURE — 73721 MRI JNT OF LWR EXTRE W/O DYE: CPT | Mod: LEFT SIDE | Performed by: RADIOLOGY

## 2023-11-13 PROCEDURE — 73721 MRI JNT OF LWR EXTRE W/O DYE: CPT | Mod: LT

## 2023-11-17 ENCOUNTER — HOSPITAL ENCOUNTER (OUTPATIENT)
Dept: GASTROENTEROLOGY | Facility: EXTERNAL LOCATION | Age: 23
Discharge: HOME | End: 2023-11-17
Payer: COMMERCIAL

## 2023-11-17 ENCOUNTER — ANESTHESIA (OUTPATIENT)
Dept: GASTROENTEROLOGY | Facility: EXTERNAL LOCATION | Age: 23
End: 2023-11-17

## 2023-11-17 VITALS
SYSTOLIC BLOOD PRESSURE: 117 MMHG | RESPIRATION RATE: 16 BRPM | DIASTOLIC BLOOD PRESSURE: 73 MMHG | TEMPERATURE: 99.3 F | HEART RATE: 65 BPM | OXYGEN SATURATION: 98 %

## 2023-11-17 DIAGNOSIS — K62.5 RECTAL BLEEDING: ICD-10-CM

## 2023-11-17 DIAGNOSIS — K64.9 ACUTE HEMORRHOID: ICD-10-CM

## 2023-11-17 PROCEDURE — 45378 DIAGNOSTIC COLONOSCOPY: CPT | Performed by: INTERNAL MEDICINE

## 2023-11-17 RX ORDER — LIDOCAINE HYDROCHLORIDE 20 MG/ML
INJECTION, SOLUTION INFILTRATION; PERINEURAL AS NEEDED
Status: DISCONTINUED | OUTPATIENT
Start: 2023-11-17 | End: 2023-11-17

## 2023-11-17 RX ORDER — ONDANSETRON HYDROCHLORIDE 2 MG/ML
4 INJECTION, SOLUTION INTRAVENOUS ONCE AS NEEDED
Status: CANCELLED | OUTPATIENT
Start: 2023-11-17

## 2023-11-17 RX ORDER — SODIUM CHLORIDE 9 MG/ML
20 INJECTION, SOLUTION INTRAVENOUS CONTINUOUS
Status: CANCELLED | OUTPATIENT
Start: 2023-11-17

## 2023-11-17 RX ORDER — PROPOFOL 10 MG/ML
INJECTION, EMULSION INTRAVENOUS AS NEEDED
Status: DISCONTINUED | OUTPATIENT
Start: 2023-11-17 | End: 2023-11-17

## 2023-11-17 RX ADMIN — PROPOFOL 50 MG: 10 INJECTION, EMULSION INTRAVENOUS at 11:00

## 2023-11-17 RX ADMIN — LIDOCAINE HYDROCHLORIDE 40 MG: 20 INJECTION, SOLUTION INFILTRATION; PERINEURAL at 10:59

## 2023-11-17 RX ADMIN — PROPOFOL 30 MG: 10 INJECTION, EMULSION INTRAVENOUS at 11:04

## 2023-11-17 RX ADMIN — PROPOFOL 50 MG: 10 INJECTION, EMULSION INTRAVENOUS at 11:02

## 2023-11-17 RX ADMIN — PROPOFOL 30 MG: 10 INJECTION, EMULSION INTRAVENOUS at 11:06

## 2023-11-17 RX ADMIN — PROPOFOL 50 MG: 10 INJECTION, EMULSION INTRAVENOUS at 11:01

## 2023-11-17 RX ADMIN — PROPOFOL 100 MG: 10 INJECTION, EMULSION INTRAVENOUS at 10:59

## 2023-11-17 RX ADMIN — PROPOFOL 30 MG: 10 INJECTION, EMULSION INTRAVENOUS at 11:08

## 2023-11-17 ASSESSMENT — PAIN SCALES - GENERAL
PAINLEVEL_OUTOF10: 0 - NO PAIN
PAIN_LEVEL: 0
PAINLEVEL_OUTOF10: 0 - NO PAIN
PAINLEVEL_OUTOF10: 0 - NO PAIN

## 2023-11-17 ASSESSMENT — COLUMBIA-SUICIDE SEVERITY RATING SCALE - C-SSRS
6. HAVE YOU EVER DONE ANYTHING, STARTED TO DO ANYTHING, OR PREPARED TO DO ANYTHING TO END YOUR LIFE?: NO
1. IN THE PAST MONTH, HAVE YOU WISHED YOU WERE DEAD OR WISHED YOU COULD GO TO SLEEP AND NOT WAKE UP?: NO
2. HAVE YOU ACTUALLY HAD ANY THOUGHTS OF KILLING YOURSELF?: NO

## 2023-11-17 ASSESSMENT — PAIN - FUNCTIONAL ASSESSMENT
PAIN_FUNCTIONAL_ASSESSMENT: 0-10
PAIN_FUNCTIONAL_ASSESSMENT: 0-10

## 2023-11-17 NOTE — H&P
Outpatient Hospital Procedure H&P    Patient Profile-Procedures  Initial Info  Patient Demographics  Name Violet Romanak  Date of Birth 2000  MRN [id number]  Address   [address] [facility] [id number] [facility] [place] [address]    Primary Phone Number [phone]  Secondary Phone Number    William Akins    Procedure(s):  Colonoscopy    Primary contact name and number   Extended Emergency Contact Information  Primary Emergency Contact: Romanak,Jason  Home Phone: [phone]  Work Phone: [phone]  Relation: Parent    General Health  Weight There were no vitals filed for this visit.  BMI There is no height or weight on file to calculate BMI.    Allergies  Allergies   Allergen Reactions    Vancomycin Anaphylaxis       Past Medical History   Past Medical History:   Diagnosis Date    Diphtheria, tetanus, acellular pertussis, and inactivated poliovirus vaccine (DTaP-IPV) administered 2000    DTaP/IPV/HBV vaccination 2000    DTaP/IPV/HBV vaccination 2000    DTaP/IPV/HBV vaccination 02/27/2003    Hepatitis B 2000    Hepatitis B 2000    Hepatitis B 03/22/2001    History of HIB vaccination 2000    History of HIB vaccination 2000    History of HIB vaccination 03/22/2001    History of tetanus, diphtheria, and acellular pertussis booster vaccination (Tdap) 02/29/2012    Influenza 12/22/2014    Meningococcal group B vaccine administered 02/22/2018    Meningococcal group B vaccine administered 02/22/2018    Other specified health status     Known health problems: none    Pneumococcal vaccine administered 01/18/2001    Pneumococcal vaccine administered 02/22/2001    Pneumococcal vaccine administered 04/26/2001    Unspecified symptoms and signs involving the genitourinary system 08/01/2020    UTI symptoms    Vaccine for human papilloma virus (HPV) types 6, 11, 16, and 18 administered 05/29/2012    Vaccine for human papilloma virus (HPV) types 6, 11, 16, and 18 administered  12/02/2014    Varicella vaccine 12/22/2014    Varicella vaccine 02/22/2018       Provider assessment  Diagnosis: Rectal bleeding   Medication Reviewed - yes  Prior to Admission medications    Medication Sig Start Date End Date Taking? Authorizing Provider   norgestimate-ethinyl estradioL (Ortho Tri-Cyclen,Trinessa) 0.18/0.215/0.25 mg-35 mcg (28) tablet Take 1 tablet by mouth once daily. 4/6/23 4/5/24 Yes Sherice Bonilla MD PhD   sodium,potassium,mag sulfates (Suprep Bowel Prep Kit) 17.5-3.13-1.6 gram recon soln solution Take 1 bottle by mouth see administration instructions. 10/19/23 11/17/23 Yes Danita Dudley APRN-CNP       Physical Exam  Vitals:    11/17/23 1015   BP: 143/87   Pulse: 85   Resp: 14   Temp: 36.8 °C (98.2 °F)   SpO2: 99%        General: A&Ox3, NAD.  HEENT: AT/NC.   CV: RRR. No murmur.  Resp: CTA bilaterally. No wheezing, rhonchi or rales.   GI: Soft, NT/ND. BSx4.  Extrem: No edema. Pulses intact.  Skin: No Jaundice.   Neuro: No focal deficits.   Psych: Normal mood and affect.        Oropharyngeal Classification II (hard and soft palate, upper portion of tonsils anduvula visible)  ASA PS Classification 2  Sedation Plan Deep  Procedure Plan - pre-procedural (re)assesment completed by physician:  discharge/transfer patient when discharge criteria met    Ricco Benavides MD  11/17/2023 10:54 AM

## 2023-11-17 NOTE — DISCHARGE INSTRUCTIONS
Patient Instructions Post Procedure      The anesthetics, sedatives or narcotics which were given to you today will be acting in your body for the next 24 hours, so you might feel a little sleepy or groggy.  This feeling should slowly wear off. Carefully read and follow the instructions.     You received sedation today:  - Do not drive or operate any machinery or power tools of any kind.   - No alcoholic beverages today, not even beer or wine.  - Do not make any important decisions or sign any legal documents.  - No over the counter medications that contain alcohol or that may cause drowsiness.    While it is common to experience mild to moderate abdominal distention, gas, or belching after your procedure, if any of these symptoms occur following discharge from the GI Lab or within one week of having your procedure, call the [facility] to be advised whether a visit to your nearest Urgent Care or Emergency Department is indicated.  Take this paper with you if you go.   - If you develop an allergic reaction to the medications that were given during your procedure such as difficulty breathing, rash, hives, severe nausea, vomiting or lightheadedness.  - If you experience chest pain, shortness of breath, severe abdominal pain, fevers and chills.  -If you develop signs and symptoms of bleeding such as blood in your spit, if your stools turn black, tarry, or bloody  - If you have not urinated within 8 hours following your procedure.  - If your IV site becomes painful, red, inflamed, or looks infected.    If you received a biopsy/polypectomy/sphincterotomy the following instructions apply below:  __ Do not use Aspirin containing products, non-steroidal medications or anti-coagulants for one week following your procedure. (Examples of these types of medications are: Advil, Arthrotec, Aleve, Coumadin, Ecotrin, Heparin, Ibuprofen, Indocin, Motrin, Naprosyn, Nuprin, Plavix, Vioxx, and Voltarin, or their generic  forms.  This list is not all-inclusive.  Check with your physician or pharmacist before resuming medications.)   __ Eat a soft diet today.  Avoid foods that are poorly digested for the next 24 hours.  These foods would include: nuts, beans, lettuce, red meats, and fried foods. Start with liquids and advance your diet as tolerated, gradually work up to eating solids.   __ Do not have a Barium Study or Enema for one week.    Your physician recommends the additional following instructions:    -You have a contact number available for emergencies. The signs and symptoms of potential delayed complications were discussed with you. You may return to normal activities tomorrow.  -Resume your previous diet or other if specified.  -Continue your present medications.   -We are waiting for your pathology results, if applicable.  -The findings and recommendations have been discussed with you and/or family.  - Please see Medication Reconciliation Form for new medication/medications prescribed.     If you experience any problems or have any questions following discharge from the GI Lab, please call: [phone] from 7 am- 4:30 pm.  In the event of an emergency please go to the closest Emergency Department or call Dr. Benavides [phone]

## 2023-11-17 NOTE — ANESTHESIA POSTPROCEDURE EVALUATION
Patient: Violet Romanak    Procedure Summary       Date: 11/17/23 Room / Location: [facility]    Anesthesia Start: 1056 Anesthesia Stop: 1114    Procedure: COLONOSCOPY Diagnosis:       Acute hemorrhoid      Rectal bleeding    Scheduled Providers: Ricco Benavides MD Responsible Provider: JULIA Robert    Anesthesia Type: MAC ASA Status: 2            Anesthesia Type: MAC    Vitals Value Taken Time   /76 11/17/23 1114   Temp 37.4 11/17/23 1114   Pulse 69 11/17/23 1114   Resp 14 11/17/23 1114   SpO2 99 11/17/23 1114       Anesthesia Post Evaluation    Patient location during evaluation: PACU  Patient participation: waiting for patient participation  Level of consciousness: responsive to physical stimuli  Pain score: 0  Pain management: adequate  Airway patency: patent  Cardiovascular status: blood pressure returned to baseline  Respiratory status: acceptable  Hydration status: acceptable  Postoperative Nausea and Vomiting: none        No notable events documented.

## 2023-11-17 NOTE — ADDENDUM NOTE
Addendum  created 11/17/23 1254 by MERVIN Robert-EDWARD    Intraprocedure Meds edited       [Initial Consultation] : an initial consultation for [Other: _____] : [unfilled] [FreeTextEntry2] : hypercoagulable state

## 2023-11-20 ENCOUNTER — OFFICE VISIT (OUTPATIENT)
Dept: ORTHOPEDIC SURGERY | Facility: CLINIC | Age: 23
End: 2023-11-20
Payer: COMMERCIAL

## 2023-11-20 DIAGNOSIS — M25.862 CYCLOPS LESION OF LEFT KNEE: Primary | ICD-10-CM

## 2023-11-20 PROBLEM — M23.40 LOOSE BODY IN KNEE: Status: ACTIVE | Noted: 2023-12-18

## 2023-11-20 PROCEDURE — 3008F BODY MASS INDEX DOCD: CPT | Performed by: ORTHOPAEDIC SURGERY

## 2023-11-20 PROCEDURE — 99213 OFFICE O/P EST LOW 20 MIN: CPT | Performed by: ORTHOPAEDIC SURGERY

## 2023-11-20 PROCEDURE — 1036F TOBACCO NON-USER: CPT | Performed by: ORTHOPAEDIC SURGERY

## 2023-11-20 NOTE — PROGRESS NOTES
History of present illness: Patient ACL reconstruction done elsewhere to 3 years ago her knee started popping and catching the summer we were concerned about meniscal tear MRI was done and she is got a large almost 2 cm x 1 cm cyclops lesion just anterior to the ACL but the rest of the graft looks intact the meniscus is intact the rest of the knee looks pretty good but she gets mechanical popping and catching symptoms now    Physical exam:    General: No acute distress or breathing difficulty or discomfort, pleasant and cooperative with the examination.    Extremities: Left knee examination is completed mild Razia with popping and catching negative Lachman's good endpoint there is no gross instability incisions are clean healed and dry intact trace effusion    Patellofemoral tracking is excellent 2 out of 4 quadrant glide    Negative posterior drawer    She can straight leg raise extensor maxim is intact    Range of motion is full 0 to about 175 but she does get popping with flexion extension just below the inferior pole the patella    Collateral stress testing is intact there is no foot drop numbness or tingling    All old incisions are clean dry healed intact    Diagnostic studies: MRI is reviewed showing a 15 x 11 mm cyclops lesion anterior to the ACL graft which is intact left knee    Impression: 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

## 2023-12-14 ENCOUNTER — OFFICE VISIT (OUTPATIENT)
Dept: ORTHOPEDIC SURGERY | Facility: CLINIC | Age: 23
End: 2023-12-14
Payer: COMMERCIAL

## 2023-12-14 ENCOUNTER — LAB (OUTPATIENT)
Dept: LAB | Facility: HOSPITAL | Age: 23
End: 2023-12-14
Payer: COMMERCIAL

## 2023-12-14 DIAGNOSIS — Z01.818 PRE-OP EXAM: ICD-10-CM

## 2023-12-14 DIAGNOSIS — M25.862 CYCLOPS LESION OF LEFT KNEE: Primary | ICD-10-CM

## 2023-12-14 DIAGNOSIS — G89.18 POSTOPERATIVE PAIN: ICD-10-CM

## 2023-12-14 DIAGNOSIS — Z41.9 ELECTIVE SURGERY: ICD-10-CM

## 2023-12-14 LAB
ALBUMIN SERPL BCP-MCNC: 4.4 G/DL (ref 3.4–5)
ALP SERPL-CCNC: 55 U/L (ref 33–110)
ALT SERPL W P-5'-P-CCNC: 18 U/L (ref 7–45)
ANION GAP SERPL CALC-SCNC: 12 MMOL/L (ref 10–20)
APPEARANCE UR: CLEAR
AST SERPL W P-5'-P-CCNC: 18 U/L (ref 9–39)
BACTERIA #/AREA URNS AUTO: ABNORMAL /HPF
BASOPHILS # BLD AUTO: 0.03 X10*3/UL (ref 0–0.1)
BASOPHILS NFR BLD AUTO: 0.6 %
BILIRUB SERPL-MCNC: 0.5 MG/DL (ref 0–1.2)
BILIRUB UR STRIP.AUTO-MCNC: NEGATIVE MG/DL
BUN SERPL-MCNC: 18 MG/DL (ref 6–23)
CALCIUM SERPL-MCNC: 9.3 MG/DL (ref 8.6–10.3)
CHLORIDE SERPL-SCNC: 104 MMOL/L (ref 98–107)
CO2 SERPL-SCNC: 27 MMOL/L (ref 21–32)
COLOR UR: YELLOW
CREAT SERPL-MCNC: 0.89 MG/DL (ref 0.5–1.05)
EOSINOPHIL # BLD AUTO: 0.1 X10*3/UL (ref 0–0.7)
EOSINOPHIL NFR BLD AUTO: 1.9 %
ERYTHROCYTE [DISTWIDTH] IN BLOOD BY AUTOMATED COUNT: 11.9 % (ref 11.5–14.5)
GFR SERPL CREATININE-BSD FRML MDRD: >90 ML/MIN/1.73M*2
GLUCOSE SERPL-MCNC: 90 MG/DL (ref 74–99)
GLUCOSE UR STRIP.AUTO-MCNC: NEGATIVE MG/DL
HCT VFR BLD AUTO: 44 % (ref 36–46)
HGB BLD-MCNC: 14.6 G/DL (ref 12–16)
HOLD SPECIMEN: NORMAL
IMM GRANULOCYTES # BLD AUTO: 0.01 X10*3/UL (ref 0–0.7)
IMM GRANULOCYTES NFR BLD AUTO: 0.2 % (ref 0–0.9)
INR PPP: 1 (ref 0.9–1.1)
KETONES UR STRIP.AUTO-MCNC: ABNORMAL MG/DL
LEUKOCYTE ESTERASE UR QL STRIP.AUTO: NEGATIVE
LYMPHOCYTES # BLD AUTO: 1.57 X10*3/UL (ref 1.2–4.8)
LYMPHOCYTES NFR BLD AUTO: 29.8 %
MCH RBC QN AUTO: 30.8 PG (ref 26–34)
MCHC RBC AUTO-ENTMCNC: 33.2 G/DL (ref 32–36)
MCV RBC AUTO: 93 FL (ref 80–100)
MONOCYTES # BLD AUTO: 0.44 X10*3/UL (ref 0.1–1)
MONOCYTES NFR BLD AUTO: 8.4 %
MUCOUS THREADS #/AREA URNS AUTO: ABNORMAL /LPF
NEUTROPHILS # BLD AUTO: 3.11 X10*3/UL (ref 1.2–7.7)
NEUTROPHILS NFR BLD AUTO: 59.1 %
NITRITE UR QL STRIP.AUTO: NEGATIVE
NRBC BLD-RTO: 0 /100 WBCS (ref 0–0)
PH UR STRIP.AUTO: 6 [PH]
PLATELET # BLD AUTO: 268 X10*3/UL (ref 150–450)
POTASSIUM SERPL-SCNC: 4 MMOL/L (ref 3.5–5.3)
PROT SERPL-MCNC: 7.2 G/DL (ref 6.4–8.2)
PROT UR STRIP.AUTO-MCNC: NEGATIVE MG/DL
PROTHROMBIN TIME: 11.7 SECONDS (ref 9.8–12.8)
RBC # BLD AUTO: 4.74 X10*6/UL (ref 4–5.2)
RBC # UR STRIP.AUTO: ABNORMAL /UL
RBC #/AREA URNS AUTO: >20 /HPF
SODIUM SERPL-SCNC: 139 MMOL/L (ref 136–145)
SP GR UR STRIP.AUTO: 1.03
UROBILINOGEN UR STRIP.AUTO-MCNC: <2 MG/DL
WBC # BLD AUTO: 5.3 X10*3/UL (ref 4.4–11.3)
WBC #/AREA URNS AUTO: ABNORMAL /HPF

## 2023-12-14 PROCEDURE — 85610 PROTHROMBIN TIME: CPT

## 2023-12-14 PROCEDURE — 36415 COLL VENOUS BLD VENIPUNCTURE: CPT

## 2023-12-14 PROCEDURE — 3008F BODY MASS INDEX DOCD: CPT | Performed by: PHYSICIAN ASSISTANT

## 2023-12-14 PROCEDURE — 85025 COMPLETE CBC W/AUTO DIFF WBC: CPT

## 2023-12-14 PROCEDURE — 1036F TOBACCO NON-USER: CPT | Performed by: PHYSICIAN ASSISTANT

## 2023-12-14 PROCEDURE — 81001 URINALYSIS AUTO W/SCOPE: CPT

## 2023-12-14 PROCEDURE — 80053 COMPREHEN METABOLIC PANEL: CPT

## 2023-12-14 PROCEDURE — 99024 POSTOP FOLLOW-UP VISIT: CPT | Performed by: PHYSICIAN ASSISTANT

## 2023-12-14 RX ORDER — OXYCODONE AND ACETAMINOPHEN 5; 325 MG/1; MG/1
1 TABLET ORAL EVERY 6 HOURS PRN
Qty: 28 TABLET | Refills: 0 | Status: SHIPPED | OUTPATIENT
Start: 2023-12-14 | End: 2023-12-21

## 2023-12-14 NOTE — H&P (VIEW-ONLY)
History and Physical      CHIEF COMPLAINT: Left knee pain and catching    HISTORY OF PRESENT ILLNESS:      The patient is a23 y.o. female with significant past medical history of left knee pain and catching diagnostic studies show a loose body.  Conservative therapy is failed to provide relief.  After risk benefits alternatives were discussed patient elects to proceed with left knee arthroscopy loose body removal.  Surgery be performed 12/18/2023 at [facility].      Past Medical History:  Past Medical History:   Diagnosis Date    COVID-19     VACCINATED    Diphtheria, tetanus, acellular pertussis, and inactivated poliovirus vaccine (DTaP-IPV) administered 2000    DTaP/IPV/HBV vaccination 2000    DTaP/IPV/HBV vaccination 2000    DTaP/IPV/HBV vaccination 02/27/2003    Hepatitis B 2000    Hepatitis B 2000    Hepatitis B 03/22/2001    History of HIB vaccination 2000    History of HIB vaccination 2000    History of HIB vaccination 03/22/2001    History of tetanus, diphtheria, and acellular pertussis booster vaccination (Tdap) 02/29/2012    Influenza 12/22/2014    Meningococcal group B vaccine administered 02/22/2018    Meningococcal group B vaccine administered 02/22/2018    Other specified health status     Known health problems: none    Pneumococcal vaccine administered 01/18/2001    Pneumococcal vaccine administered 02/22/2001    Pneumococcal vaccine administered 04/26/2001    Unspecified symptoms and signs involving the genitourinary system 08/01/2020    UTI symptoms    Vaccine for human papilloma virus (HPV) types 6, 11, 16, and 18 administered 05/29/2012    Vaccine for human papilloma virus (HPV) types 6, 11, 16, and 18 administered 12/02/2014    Varicella vaccine 12/22/2014    Varicella vaccine 02/22/2018        Past Surgical History:    Past Surgical History:   Procedure Laterality Date    OTHER SURGICAL HISTORY  08/01/2020    [place] tooth extraction     OTHER SURGICAL HISTORY Left 01/13/2022    Anterior cruciate ligament repair    TONSILLECTOMY         Medications Prior to Admission:    Current Outpatient Medications on File Prior to Visit   Medication Sig Dispense Refill    norgestimate-ethinyl estradioL (Ortho Tri-Cyclen,Trinessa) 0.18/0.215/0.25 mg-35 mcg (28) tablet Take 1 tablet by mouth once daily. 28 tablet 12     No current facility-administered medications on file prior to visit.        Allergies:  Vancomycin    Social History:   Social History     Socioeconomic History    Marital status: Single     Spouse name: Not on file    Number of children: Not on file    Years of education: Not on file    Highest education level: Not on file   Occupational History    Not on file   Tobacco Use    Smoking status: Never     Passive exposure: Never    Smokeless tobacco: Never   Vaping Use    Vaping Use: Never used   Substance and Sexual Activity    Alcohol use: Yes     Alcohol/week: 3.0 standard drinks of alcohol     Types: 1 Glasses of wine, 1 Cans of beer, 1 Shots of liquor per week     Comment: ONCE A WEEK    Drug use: Never    Sexual activity: Defer   Other Topics Concern    Not on file   Social History Narrative    Not on file     Social Determinants of Health     Financial Resource Strain: Not on file   Food Insecurity: Not on file   Transportation Needs: Not on file   Physical Activity: Not on file   Stress: Not on file   Social Connections: Not on file   Intimate Partner Violence: Not on file   Housing Stability: Not on file       Family History:   Family History   Problem Relation Name Age of Onset    No Known Problems Mother      Breast cancer Other G/P     No Known Problems Other FAM HX         Review of systems: Noncontributory for orthopedics    Vitals:  There were no vitals taken for this visit.    Physical examination:  Head normocephalic atraumatic  Heart regular rate and rhythm  Lungs clear  Abdominal exam nontender nondistended  Extremity: Left knee  positive effusion current range of motion is 0 to 130 degrees    Impression : Left knee loose body      Plan:  Scheduled for left knee arthroscopy loose body removal    Risk and benefits of surgery discussed extensively with the patient.    Surgical risk included but were not limited to infection, wear, loosening, need for further surgery blood clot, failure to heal, failure of the surgery, stiffness, loss of limb life, extremity function change in length change, and associated risks of surgery during the coronavirus epidemic.    Risk with any surgery including arthroplasty and replacements include but are not limited to:       Wear, loosening, infection, blood clot, DVT, loss of limb, life, delayed recovery, limb length change, instability, dislocation, discomfort with new implant and failure of the procedure.

## 2023-12-14 NOTE — PROGRESS NOTES
History and Physical      CHIEF COMPLAINT: Left knee pain and catching    HISTORY OF PRESENT ILLNESS:      The patient is a23 y.o. female with significant past medical history of left knee pain and catching diagnostic studies show a loose body.  Conservative therapy is failed to provide relief.  After risk benefits alternatives were discussed patient elects to proceed with left knee arthroscopy loose body removal.  Surgery be performed 12/18/2023 at [facility].      Past Medical History:  Past Medical History:   Diagnosis Date    COVID-19     VACCINATED    Diphtheria, tetanus, acellular pertussis, and inactivated poliovirus vaccine (DTaP-IPV) administered 2000    DTaP/IPV/HBV vaccination 2000    DTaP/IPV/HBV vaccination 2000    DTaP/IPV/HBV vaccination 02/27/2003    Hepatitis B 2000    Hepatitis B 2000    Hepatitis B 03/22/2001    History of HIB vaccination 2000    History of HIB vaccination 2000    History of HIB vaccination 03/22/2001    History of tetanus, diphtheria, and acellular pertussis booster vaccination (Tdap) 02/29/2012    Influenza 12/22/2014    Meningococcal group B vaccine administered 02/22/2018    Meningococcal group B vaccine administered 02/22/2018    Other specified health status     Known health problems: none    Pneumococcal vaccine administered 01/18/2001    Pneumococcal vaccine administered 02/22/2001    Pneumococcal vaccine administered 04/26/2001    Unspecified symptoms and signs involving the genitourinary system 08/01/2020    UTI symptoms    Vaccine for human papilloma virus (HPV) types 6, 11, 16, and 18 administered 05/29/2012    Vaccine for human papilloma virus (HPV) types 6, 11, 16, and 18 administered 12/02/2014    Varicella vaccine 12/22/2014    Varicella vaccine 02/22/2018        Past Surgical History:    Past Surgical History:   Procedure Laterality Date    OTHER SURGICAL HISTORY  08/01/2020    [place] tooth extraction     OTHER SURGICAL HISTORY Left 01/13/2022    Anterior cruciate ligament repair    TONSILLECTOMY         Medications Prior to Admission:    Current Outpatient Medications on File Prior to Visit   Medication Sig Dispense Refill    norgestimate-ethinyl estradioL (Ortho Tri-Cyclen,Trinessa) 0.18/0.215/0.25 mg-35 mcg (28) tablet Take 1 tablet by mouth once daily. 28 tablet 12     No current facility-administered medications on file prior to visit.        Allergies:  Vancomycin    Social History:   Social History     Socioeconomic History    Marital status: Single     Spouse name: Not on file    Number of children: Not on file    Years of education: Not on file    Highest education level: Not on file   Occupational History    Not on file   Tobacco Use    Smoking status: Never     Passive exposure: Never    Smokeless tobacco: Never   Vaping Use    Vaping Use: Never used   Substance and Sexual Activity    Alcohol use: Yes     Alcohol/week: 3.0 standard drinks of alcohol     Types: 1 Glasses of wine, 1 Cans of beer, 1 Shots of liquor per week     Comment: ONCE A WEEK    Drug use: Never    Sexual activity: Defer   Other Topics Concern    Not on file   Social History Narrative    Not on file     Social Determinants of Health     Financial Resource Strain: Not on file   Food Insecurity: Not on file   Transportation Needs: Not on file   Physical Activity: Not on file   Stress: Not on file   Social Connections: Not on file   Intimate Partner Violence: Not on file   Housing Stability: Not on file       Family History:   Family History   Problem Relation Name Age of Onset    No Known Problems Mother      Breast cancer Other G/P     No Known Problems Other FAM HX         Review of systems: Noncontributory for orthopedics    Vitals:  There were no vitals taken for this visit.    Physical examination:  Head normocephalic atraumatic  Heart regular rate and rhythm  Lungs clear  Abdominal exam nontender nondistended  Extremity: Left knee  positive effusion current range of motion is 0 to 130 degrees    Impression : Left knee loose body      Plan:  Scheduled for left knee arthroscopy loose body removal    Risk and benefits of surgery discussed extensively with the patient.    Surgical risk included but were not limited to infection, wear, loosening, need for further surgery blood clot, failure to heal, failure of the surgery, stiffness, loss of limb life, extremity function change in length change, and associated risks of surgery during the coronavirus epidemic.    Risk with any surgery including arthroplasty and replacements include but are not limited to:       Wear, loosening, infection, blood clot, DVT, loss of limb, life, delayed recovery, limb length change, instability, dislocation, discomfort with new implant and failure of the procedure.

## 2023-12-18 ENCOUNTER — HOSPITAL ENCOUNTER (OUTPATIENT)
Facility: HOSPITAL | Age: 23
Setting detail: OUTPATIENT SURGERY
Discharge: HOME | End: 2023-12-18
Attending: ORTHOPAEDIC SURGERY | Admitting: ORTHOPAEDIC SURGERY
Payer: COMMERCIAL

## 2023-12-18 ENCOUNTER — ANESTHESIA EVENT (OUTPATIENT)
Dept: OPERATING ROOM | Facility: HOSPITAL | Age: 23
End: 2023-12-18
Payer: COMMERCIAL

## 2023-12-18 ENCOUNTER — ANESTHESIA (OUTPATIENT)
Dept: OPERATING ROOM | Facility: HOSPITAL | Age: 23
End: 2023-12-18
Payer: COMMERCIAL

## 2023-12-18 VITALS
TEMPERATURE: 97.3 F | OXYGEN SATURATION: 99 % | HEART RATE: 66 BPM | BODY MASS INDEX: 34.32 KG/M2 | WEIGHT: 231.7 LBS | DIASTOLIC BLOOD PRESSURE: 65 MMHG | SYSTOLIC BLOOD PRESSURE: 105 MMHG | HEIGHT: 69 IN | RESPIRATION RATE: 16 BRPM

## 2023-12-18 DIAGNOSIS — M23.42 LOOSE BODY OF LEFT KNEE: ICD-10-CM

## 2023-12-18 DIAGNOSIS — S83.519A PARTIAL TEAR OF ANTERIOR CRUCIATE LIGAMENT OF KNEE: Primary | ICD-10-CM

## 2023-12-18 LAB — PREGNANCY TEST URINE, POC: NEGATIVE

## 2023-12-18 PROCEDURE — 2500000005 HC RX 250 GENERAL PHARMACY W/O HCPCS: Performed by: NURSE ANESTHETIST, CERTIFIED REGISTERED

## 2023-12-18 PROCEDURE — 3600000009 HC OR TIME - EACH INCREMENTAL 1 MINUTE - PROCEDURE LEVEL FOUR: Performed by: ORTHOPAEDIC SURGERY

## 2023-12-18 PROCEDURE — 2500000005 HC RX 250 GENERAL PHARMACY W/O HCPCS: Performed by: ORTHOPAEDIC SURGERY

## 2023-12-18 PROCEDURE — 7100000010 HC PHASE TWO TIME - EACH INCREMENTAL 1 MINUTE: Performed by: ORTHOPAEDIC SURGERY

## 2023-12-18 PROCEDURE — 7100000002 HC RECOVERY ROOM TIME - EACH INCREMENTAL 1 MINUTE: Performed by: ORTHOPAEDIC SURGERY

## 2023-12-18 PROCEDURE — 2720000007 HC OR 272 NO HCPCS: Performed by: ORTHOPAEDIC SURGERY

## 2023-12-18 PROCEDURE — 3600000004 HC OR TIME - INITIAL BASE CHARGE - PROCEDURE LEVEL FOUR: Performed by: ORTHOPAEDIC SURGERY

## 2023-12-18 PROCEDURE — 81025 URINE PREGNANCY TEST: CPT | Performed by: ORTHOPAEDIC SURGERY

## 2023-12-18 PROCEDURE — 2500000001 HC RX 250 WO HCPCS SELF ADMINISTERED DRUGS (ALT 637 FOR MEDICARE OP): Performed by: ANESTHESIOLOGY

## 2023-12-18 PROCEDURE — 29874 ARTHRS KNEE SURG RMV LOOS/FB: CPT | Performed by: ORTHOPAEDIC SURGERY

## 2023-12-18 PROCEDURE — 3700000001 HC GENERAL ANESTHESIA TIME - INITIAL BASE CHARGE: Performed by: ORTHOPAEDIC SURGERY

## 2023-12-18 PROCEDURE — 2500000004 HC RX 250 GENERAL PHARMACY W/ HCPCS (ALT 636 FOR OP/ED): Performed by: ORTHOPAEDIC SURGERY

## 2023-12-18 PROCEDURE — A4217 STERILE WATER/SALINE, 500 ML: HCPCS | Performed by: ORTHOPAEDIC SURGERY

## 2023-12-18 PROCEDURE — 2500000004 HC RX 250 GENERAL PHARMACY W/ HCPCS (ALT 636 FOR OP/ED): Performed by: NURSE ANESTHETIST, CERTIFIED REGISTERED

## 2023-12-18 PROCEDURE — 7100000009 HC PHASE TWO TIME - INITIAL BASE CHARGE: Performed by: ORTHOPAEDIC SURGERY

## 2023-12-18 PROCEDURE — 7100000001 HC RECOVERY ROOM TIME - INITIAL BASE CHARGE: Performed by: ORTHOPAEDIC SURGERY

## 2023-12-18 PROCEDURE — 3700000002 HC GENERAL ANESTHESIA TIME - EACH INCREMENTAL 1 MINUTE: Performed by: ORTHOPAEDIC SURGERY

## 2023-12-18 RX ORDER — MIDAZOLAM HYDROCHLORIDE 1 MG/ML
INJECTION, SOLUTION INTRAMUSCULAR; INTRAVENOUS AS NEEDED
Status: DISCONTINUED | OUTPATIENT
Start: 2023-12-18 | End: 2023-12-18

## 2023-12-18 RX ORDER — OXYCODONE HYDROCHLORIDE 5 MG/1
5 TABLET ORAL EVERY 4 HOURS PRN
Status: DISCONTINUED | OUTPATIENT
Start: 2023-12-18 | End: 2023-12-18 | Stop reason: HOSPADM

## 2023-12-18 RX ORDER — SODIUM CHLORIDE, SODIUM LACTATE, POTASSIUM CHLORIDE, CALCIUM CHLORIDE 600; 310; 30; 20 MG/100ML; MG/100ML; MG/100ML; MG/100ML
50 INJECTION, SOLUTION INTRAVENOUS CONTINUOUS
Status: DISCONTINUED | OUTPATIENT
Start: 2023-12-18 | End: 2023-12-18 | Stop reason: HOSPADM

## 2023-12-18 RX ORDER — CEFAZOLIN 1 G/1
INJECTION, POWDER, FOR SOLUTION INTRAVENOUS AS NEEDED
Status: DISCONTINUED | OUTPATIENT
Start: 2023-12-18 | End: 2023-12-18

## 2023-12-18 RX ORDER — LIDOCAINE HYDROCHLORIDE 20 MG/ML
INJECTION, SOLUTION INFILTRATION; PERINEURAL AS NEEDED
Status: DISCONTINUED | OUTPATIENT
Start: 2023-12-18 | End: 2023-12-18

## 2023-12-18 RX ORDER — MEPERIDINE HYDROCHLORIDE 25 MG/ML
12.5 INJECTION INTRAMUSCULAR; INTRAVENOUS; SUBCUTANEOUS EVERY 10 MIN PRN
Status: CANCELLED | OUTPATIENT
Start: 2023-12-18

## 2023-12-18 RX ORDER — SODIUM CHLORIDE, SODIUM LACTATE, POTASSIUM CHLORIDE, CALCIUM CHLORIDE 600; 310; 30; 20 MG/100ML; MG/100ML; MG/100ML; MG/100ML
100 INJECTION, SOLUTION INTRAVENOUS CONTINUOUS
Status: CANCELLED | OUTPATIENT
Start: 2023-12-18

## 2023-12-18 RX ORDER — PROPOFOL 10 MG/ML
INJECTION, EMULSION INTRAVENOUS AS NEEDED
Status: DISCONTINUED | OUTPATIENT
Start: 2023-12-18 | End: 2023-12-18

## 2023-12-18 RX ORDER — ONDANSETRON 4 MG/1
4 TABLET, FILM COATED ORAL EVERY 8 HOURS PRN
Status: DISCONTINUED | OUTPATIENT
Start: 2023-12-18 | End: 2023-12-18 | Stop reason: HOSPADM

## 2023-12-18 RX ORDER — DEXAMETHASONE SODIUM PHOSPHATE 4 MG/ML
INJECTION, SOLUTION INTRA-ARTICULAR; INTRALESIONAL; INTRAMUSCULAR; INTRAVENOUS; SOFT TISSUE AS NEEDED
Status: DISCONTINUED | OUTPATIENT
Start: 2023-12-18 | End: 2023-12-18

## 2023-12-18 RX ORDER — OXYCODONE HYDROCHLORIDE 5 MG/1
10 TABLET ORAL EVERY 4 HOURS PRN
Status: DISCONTINUED | OUTPATIENT
Start: 2023-12-18 | End: 2023-12-18 | Stop reason: HOSPADM

## 2023-12-18 RX ORDER — DIPHENHYDRAMINE HYDROCHLORIDE 50 MG/ML
INJECTION INTRAMUSCULAR; INTRAVENOUS AS NEEDED
Status: DISCONTINUED | OUTPATIENT
Start: 2023-12-18 | End: 2023-12-18

## 2023-12-18 RX ORDER — FAMOTIDINE 10 MG/ML
INJECTION INTRAVENOUS AS NEEDED
Status: DISCONTINUED | OUTPATIENT
Start: 2023-12-18 | End: 2023-12-18

## 2023-12-18 RX ORDER — ONDANSETRON HYDROCHLORIDE 2 MG/ML
INJECTION, SOLUTION INTRAVENOUS AS NEEDED
Status: DISCONTINUED | OUTPATIENT
Start: 2023-12-18 | End: 2023-12-18

## 2023-12-18 RX ORDER — CEFAZOLIN SODIUM 2 G/100ML
2 INJECTION, SOLUTION INTRAVENOUS ONCE
Status: DISCONTINUED | OUTPATIENT
Start: 2023-12-18 | End: 2023-12-18 | Stop reason: HOSPADM

## 2023-12-18 RX ORDER — KETOROLAC TROMETHAMINE 30 MG/ML
INJECTION, SOLUTION INTRAMUSCULAR; INTRAVENOUS AS NEEDED
Status: DISCONTINUED | OUTPATIENT
Start: 2023-12-18 | End: 2023-12-18

## 2023-12-18 RX ORDER — ACETAMINOPHEN 325 MG/1
TABLET ORAL AS NEEDED
Status: DISCONTINUED | OUTPATIENT
Start: 2023-12-18 | End: 2023-12-18

## 2023-12-18 RX ORDER — FENTANYL CITRATE 50 UG/ML
25 INJECTION, SOLUTION INTRAMUSCULAR; INTRAVENOUS EVERY 5 MIN PRN
Status: CANCELLED | OUTPATIENT
Start: 2023-12-18

## 2023-12-18 RX ORDER — OXYCODONE AND ACETAMINOPHEN 5; 325 MG/1; MG/1
1 TABLET ORAL EVERY 6 HOURS PRN
Status: DISCONTINUED | OUTPATIENT
Start: 2023-12-18 | End: 2023-12-18 | Stop reason: HOSPADM

## 2023-12-18 RX ORDER — FENTANYL CITRATE 50 UG/ML
50 INJECTION, SOLUTION INTRAMUSCULAR; INTRAVENOUS EVERY 5 MIN PRN
Status: CANCELLED | OUTPATIENT
Start: 2023-12-18

## 2023-12-18 RX ORDER — FENTANYL CITRATE 50 UG/ML
INJECTION, SOLUTION INTRAMUSCULAR; INTRAVENOUS AS NEEDED
Status: DISCONTINUED | OUTPATIENT
Start: 2023-12-18 | End: 2023-12-18

## 2023-12-18 RX ORDER — BUPIVACAINE HYDROCHLORIDE 5 MG/ML
INJECTION, SOLUTION PERINEURAL AS NEEDED
Status: DISCONTINUED | OUTPATIENT
Start: 2023-12-18 | End: 2023-12-18 | Stop reason: HOSPADM

## 2023-12-18 RX ORDER — SODIUM CHLORIDE 0.9 G/100ML
IRRIGANT IRRIGATION AS NEEDED
Status: DISCONTINUED | OUTPATIENT
Start: 2023-12-18 | End: 2023-12-18 | Stop reason: HOSPADM

## 2023-12-18 RX ADMIN — ONDANSETRON 4 MG: 2 INJECTION, SOLUTION INTRAMUSCULAR; INTRAVENOUS at 07:42

## 2023-12-18 RX ADMIN — LIDOCAINE HYDROCHLORIDE 80 MG: 20 INJECTION, SOLUTION INFILTRATION; PERINEURAL at 07:34

## 2023-12-18 RX ADMIN — PROPOFOL 200 MG: 10 INJECTION, EMULSION INTRAVENOUS at 07:34

## 2023-12-18 RX ADMIN — ACETAMINOPHEN 975 MG: 325 TABLET ORAL at 07:06

## 2023-12-18 RX ADMIN — CEFAZOLIN SODIUM 2 G: 1 POWDER, FOR SOLUTION INTRAMUSCULAR; INTRAVENOUS at 07:30

## 2023-12-18 RX ADMIN — FENTANYL CITRATE 50 MCG: 50 INJECTION, SOLUTION INTRAMUSCULAR; INTRAVENOUS at 07:55

## 2023-12-18 RX ADMIN — DEXAMETHASONE SODIUM PHOSPHATE 8 MG: 4 INJECTION, SOLUTION INTRAMUSCULAR; INTRAVENOUS at 07:42

## 2023-12-18 RX ADMIN — FAMOTIDINE 20 MG: 10 INJECTION, SOLUTION INTRAVENOUS at 07:42

## 2023-12-18 RX ADMIN — DIPHENHYDRAMINE HYDROCHLORIDE 12.5 MG: 50 INJECTION INTRAMUSCULAR; INTRAVENOUS at 07:42

## 2023-12-18 RX ADMIN — FENTANYL CITRATE 25 MCG: 50 INJECTION, SOLUTION INTRAMUSCULAR; INTRAVENOUS at 07:48

## 2023-12-18 RX ADMIN — OXYCODONE HYDROCHLORIDE 5 MG: 5 TABLET ORAL at 09:14

## 2023-12-18 RX ADMIN — SODIUM CHLORIDE, POTASSIUM CHLORIDE, SODIUM LACTATE AND CALCIUM CHLORIDE 50 ML/HR: 600; 310; 30; 20 INJECTION, SOLUTION INTRAVENOUS at 05:58

## 2023-12-18 RX ADMIN — KETOROLAC TROMETHAMINE 30 MG: 30 INJECTION, SOLUTION INTRAMUSCULAR at 07:57

## 2023-12-18 RX ADMIN — MIDAZOLAM 2 MG: 1 INJECTION INTRAMUSCULAR; INTRAVENOUS at 07:32

## 2023-12-18 RX ADMIN — FENTANYL CITRATE 25 MCG: 50 INJECTION, SOLUTION INTRAMUSCULAR; INTRAVENOUS at 07:34

## 2023-12-18 ASSESSMENT — PAIN SCALES - GENERAL
PAINLEVEL_OUTOF10: 2
PAINLEVEL_OUTOF10: 0 - NO PAIN
PAIN_LEVEL: 0
PAINLEVEL_OUTOF10: 0 - NO PAIN
PAINLEVEL_OUTOF10: 3
PAINLEVEL_OUTOF10: 0 - NO PAIN

## 2023-12-18 ASSESSMENT — PAIN - FUNCTIONAL ASSESSMENT
PAIN_FUNCTIONAL_ASSESSMENT: 0-10

## 2023-12-18 ASSESSMENT — PAIN DESCRIPTION - DESCRIPTORS: DESCRIPTORS: ACHING

## 2023-12-18 NOTE — ANESTHESIA POSTPROCEDURE EVALUATION
Patient: Violet Romanak    Procedure Summary       Date: 12/18/23 Room / Location: [facility] / [facility]    Anesthesia Start: 0730 Anesthesia Stop: 0811    Procedure: Arthroscopy Left Knee Loose Body Removal, Partial ACL repair, Synovectomy (Left: Knee) Diagnosis:       Loose body of left knee      Partial tear of anterior cruciate ligament of knee      (Loose body in knee [M23.40])    Surgeons: Jimmy Hernandez MD Responsible Provider: Ceasar Bhagat MD    Anesthesia Type: general ASA Status: 2            Anesthesia Type: general    Vitals Value Taken Time   /94 12/18/23 0811   Temp 36.1 12/18/23 0811   Pulse 61 12/18/23 0811   Resp 13 12/18/23 0809   SpO2 100 % 12/18/23 0809   Vitals shown include unvalidated device data.    Anesthesia Post Evaluation    Patient location during evaluation: PACU  Patient participation: complete - patient participated  Level of consciousness: awake  Pain score: 0  Pain management: adequate  Airway patency: patent  Cardiovascular status: acceptable, stable and hemodynamically stable  Respiratory status: acceptable, face mask, nonlabored ventilation and spontaneous ventilation  Hydration status: acceptable  Postoperative Nausea and Vomiting: none        No notable events documented.

## 2023-12-18 NOTE — DISCHARGE INSTRUCTIONS
Medication given may have significant effects after discharge. Therefore on the day of surgery:  1) you must be accompanied by a responsible adult upon discharge and for 24 hours after surgery.  Do not drive a motor vehicle, operate machinery, power tools or appliance, drink alcoholic beverages, or make critical decisions for 24 hours  2) Be aware of dizziness, which may cause a fall. Change positions slowly.  3) Eating: you may resume your regular diet but it is better to increase intake slowly with mild foods and working up to your regular diet. No greasy, fried or spicy foods today.  4) Nausea/Vomiting: Nausea and vomiting may occur as you become more active or begin to increase food intake. If this should happen, decrease activity and return to liquids. If the problem persists, call your surgeon  5) Pain: Your surgeon may have given you a prescription for pain medication. Take pain medication with food as prescribed. Pain medication may cause constipation, so drink plenty of fluids. If your pain medication does not provide adequate relief, call your surgeon  6) Urinating: Notify your surgeon if you have not urinated within 12 hours after discharge  7) Ice: Apply ice to operative site for 20 min 5-6 times a day or use Polar care as instructed  8) Dressing:   [x]   Remove dressing in 24hr    []  Remove dressing in 48hr   [x]  Leave open to air after initial dressing is taken off and incision is dry then okay to shower  Do not remove the steri-strips. (no bath/ hot tubs/ pools)   []  Leave dressing in place. Keep dressing/ incision clean and dry    9) Activity    Shoulder/ elbow/Hand   []  Elevate extremity    []  Sling   [] at all times (except for exercises and showering)  [] as needed only for comfort   [] Begin daily motion exercises out of sling as instructed   []  Bend and flex fingers/wrist/elbow frequently   [] other   Knee/ Ankle/ Foot   [x] elevate extremity   [x] crutches        [] non-weight bearing  to operative extremity  [] partial weight bearing  [x] Full weight bearing as tolerated   []  Use brace whenever walking   [] other      10) Begin physical therapy if advised by you physician:   [] before returning to see you doctor   [x] not until you follow up with your doctor    11) call your doctor at [phone] for an appointment (or follow up as scheduled)    Contact [facility] office if  Increased redness, swelling, drainage of any kind, and/or pain to surgery site.  As well as new onset fevers and or chills.  These could signify an infection.  Calf or thigh tenderness to touch as well as increased swelling or redness.  This could signify a clot formation.  Numbness or tingling to an area around the incision site or below the incision site (toes). Or if the operative extremity becomes cold, blue.  Any rash appears, increased  or new onset nausea/vomiting occur.  This may indicate a reaction to a medication.  Temp is 38.5 C (101F)  12) If you have any concerns or questions, please call [facility] surgeon on call. The 24- hour phone is [phone]  13) If you are unable to contact your surgeon, in an emergency situation, go to the nearest hospital

## 2023-12-18 NOTE — OP NOTE
Arthroscopy Left Knee Loose Body Removal, Partial ACL repair, Synovectomy (L) Operative Note  Preop diagnosis: Left knee loose body    Postop diagnosis: Left knee loose body and partial ACL tear      Assistant: Jimmy Roberts physician assistant (PAC) was required and present throughout the entire case.  Given the nature of the disease process and the procedure, a skilled surgical first assistant was necessary during the entire case.  The assistant was necessary to hold retractors and manipulate extremity during the procedure.  A certified scrub tech was also present at the back table managing instruments with supplies for the surgical case    Date: 2023  OR Location: [facility]    Name: Violet Romanak, : 2000, Age: 23 y.o., MRN: [id number], Sex: female    Diagnosis  Pre-op Diagnosis     * Loose body in knee [M23.40] Post-op Diagnosis     * Loose body in knee [M23.40]     * Partial tear of anterior cruciate ligament of knee [S83.519A]     Procedures  Left knee arthroscopic loose body removal 83816.    Left knee arthroscopic limited synovectomy debridement of partial ACL tear 37641    Surgeons      * Jimmy Hernandez - Primary     * Jimmy Roberts    Resident/Fellow/Other Assistant:  Surgeon(s) and Role:     * Jimmy Roberts PA-C    Procedure Summary  Anesthesia: General  ASA: II  Anesthesia Staff: Anesthesiologist: Ceasar Bhagat MD  CRNA: MERVIN Kasper-CRNA  Estimated Blood Loss: Minimal mL  Intra-op Medications:   Medication Name Total Dose   sodium chloride 0.9 % irrigation solution 3,000 mL   BUPivacaine HCl (Marcaine) 0.5 % (5 mg/mL) injection 30 mL              Anesthesia Record               Intraprocedure I/O Totals       None           Specimen: No specimens collected     Staff:   Circulator: Vaishnavi Verdugo RN  Scrub Person: Pily Pickens         Drains and/or Catheters: * None in log *    Tourniquet Times:     Total Tourniquet Time Documented:  Thigh (Left) -  12 minutes  Total: Thigh (Left) - 12 minutes      Implants:     Findings: see procedure details    Indications: Violet Romanak is an 23 y.o. female who is having surgery for Loose body in knee [M23.40].     The patient was seen in the preoperative area. The risks, benefits, complications, treatment options, non-operative alternatives, expected recovery and outcomes were discussed with the patient. The possibilities of reaction to medication, pulmonary aspiration, injury to surrounding structures, bleeding, recurrent infection, the need for additional procedures, failure to diagnose a condition, and creating a complication requiring transfusion or operation were discussed with the patient. The patient concurred with the proposed plan, giving informed consent.  The site of surgery was properly noted/marked if necessary per policy. The patient has been actively warmed in preoperative area. Preoperative antibiotics have been ordered and given within 1 hours of incision. Venous thrombosis prophylaxis have been ordered.    Procedure Details:   Patient brought to operating room for [facility]    Patient had routine prep and drape    Tourniquet was inflated after Esmarch exsanguination    Exam under anesthesia showed full motion with no instability she had a stable endpoint there was no rotatory instability at 0 3069 degrees and there was no posterior instability    Collateral taken he was intact    Standard portals were established using the old portal sites around the patellar tendon    Sequential examination the was carried out    Patellofemoral joint was relatively clean pristine medial lateral gutters were empty in the lateral joint space the condylar surfaces were pristine as was the meniscus there was no meniscal tear laterally or chondral damage laterally    In the notch position there was a loose body as pointed out on MRI it was photodocumentation was approximately 10 x 11 to 12 mm it was  fibrocartilaginous it was photodocumented and then removed    After removal we also inspected the ACL and there was a partial tear of the anterior lateral portion of the ACL which was debrided a limited synovectomy was carried out and this was photo document as well we estimated that this involves less than 10% of the ACL the residual fibers appear to be taut well-positioned and intact.    In the medial joint space there was no meniscal tear or chondral damage this was photo document as well    We flushed the knee clean of all loose material completed our final photodocumentation we withdrew the camera drained and a closed the portals with nylon placed Marcaine and the portal sites with comfort a compressive dressing was applied anesthesia was reversed the tourniquet was deflated the patient was taken to recovery    Complications:  None; patient tolerated the procedure well.    Disposition: PACU - hemodynamically stable.  Condition: stable         Jimmy Hernandez  Phone Number: [phone]

## 2023-12-18 NOTE — ANESTHESIA PROCEDURE NOTES
Airway  Date/Time: 12/18/2023 7:35 AM  Urgency: elective    Airway not difficult    Staffing  Performed: CRNA   Authorized by: Ceasar Bhagat MD    Performed by: MERVIN Kasper-EDWARD  Patient location during procedure: OR    Indications and Patient Condition  Indications for airway management: anesthesia  Spontaneous ventilation: present  Sedation level: minimal  Preoxygenated: yes  Mask difficulty assessment: 0 - not attempted  Planned trial extubation    Final Airway Details  Final airway type: supraglottic airway      Successful airway: classic  Size 4     Number of attempts at approach: 1  Number of other approaches attempted: 0

## 2023-12-18 NOTE — ANESTHESIA PREPROCEDURE EVALUATION
Violet Romanak is a 23 y.o. female here for:    Relevant Problems   Cardiovascular   (+) Hyperlipidemia      Endocrine   (+) Class 1 obesity due to excess calories without serious comorbidity with body mass index (BMI) of 34.0 to 34.9 in adult     Arthroscopy Knee Loose Body Removal  With Jimmy Hernandez MD  Pre-Op Diagnosis Codes:     * Loose body of knee [M23.40]    Lab Results   Component Value Date    HGB 14.6 12/14/2023    HCT 44.0 12/14/2023    WBC 5.3 12/14/2023     12/14/2023     12/14/2023    K 4.0 12/14/2023     12/14/2023    CREATININE 0.89 12/14/2023    BUN 18 12/14/2023       Social History     Substance and Sexual Activity   Drug Use Never      Tobacco Use: Low Risk  (12/18/2023)    Patient History     Smoking Tobacco Use: Never     Smokeless Tobacco Use: Never     Passive Exposure: Never      Social History     Substance and Sexual Activity   Alcohol Use Yes    Alcohol/week: 3.0 standard drinks of alcohol    Types: 1 Glasses of wine, 1 Cans of beer, 1 Shots of liquor per week    Comment: ONCE A WEEK        Allergies   Allergen Reactions    Vancomycin Anaphylaxis       Current Outpatient Medications   Medication Instructions    norgestimate-ethinyl estradioL (Ortho Tri-Cyclen,Trinessa) 0.18/0.215/0.25 mg-35 mcg (28) tablet 1 tablet, oral, Daily    oxyCODONE-acetaminophen (Percocet) 5-325 mg tablet 1 tablet, oral, Every 6 hours PRN       Past Medical History:   Diagnosis Date    COVID-19     VACCINATED    Diphtheria, tetanus, acellular pertussis, and inactivated poliovirus vaccine (DTaP-IPV) administered 2000    DTaP/IPV/HBV vaccination 2000    DTaP/IPV/HBV vaccination 2000    DTaP/IPV/HBV vaccination 02/27/2003    Hepatitis B 2000    Hepatitis B 2000    Hepatitis B 03/22/2001    History of HIB vaccination 2000    History of HIB vaccination 2000    History of HIB vaccination 03/22/2001    History of tetanus, diphtheria, and acellular  pertussis booster vaccination (Tdap) 02/29/2012    Influenza 12/22/2014    Meningococcal group B vaccine administered 02/22/2018    Meningococcal group B vaccine administered 02/22/2018    Other specified health status     Known health problems: none    Pneumococcal vaccine administered 01/18/2001    Pneumococcal vaccine administered 02/22/2001    Pneumococcal vaccine administered 04/26/2001    Unspecified symptoms and signs involving the genitourinary system 08/01/2020    UTI symptoms    Vaccine for human papilloma virus (HPV) types 6, 11, 16, and 18 administered 05/29/2012    Vaccine for human papilloma virus (HPV) types 6, 11, 16, and 18 administered 12/02/2014    Varicella vaccine 12/22/2014    Varicella vaccine 02/22/2018       Past Surgical History:   Procedure Laterality Date    OTHER SURGICAL HISTORY  08/01/2020    [place] tooth extraction    OTHER SURGICAL HISTORY Left 01/13/2022    Anterior cruciate ligament repair    TONSILLECTOMY         Family History   Problem Relation Name Age of Onset    No Known Problems Mother      Breast cancer Other G/P     No Known Problems Other FAM HX        NPO Details:  NPO/Void Status  Carbonhydrate Drink Given Prior to Surgery? : N  Date of Last Liquid: 12/17/23  Time of Last Liquid: 2000  Date of Last Solid: 12/17/23  Time of Last Solid: 2350  Last Intake Type: Clear fluids  Time of Last Void: 0530        Physical Exam    Airway  Mallampati: I  TM distance: >3 FB  Neck ROM: full     Cardiovascular - normal exam     Dental - normal exam     Pulmonary - normal exam     Abdominal   (+) obese  Abdomen: soft             Anesthesia Plan    ASA 2     general     intravenous induction   Anesthetic plan and risks discussed with patient.    Plan discussed with CRNA.

## 2023-12-21 ENCOUNTER — OFFICE VISIT (OUTPATIENT)
Dept: ORTHOPEDIC SURGERY | Facility: CLINIC | Age: 23
End: 2023-12-21
Payer: COMMERCIAL

## 2023-12-21 DIAGNOSIS — M25.862 CYCLOPS LESION OF LEFT KNEE: Primary | ICD-10-CM

## 2023-12-21 PROCEDURE — 99024 POSTOP FOLLOW-UP VISIT: CPT | Performed by: PHYSICIAN ASSISTANT

## 2023-12-21 PROCEDURE — 3008F BODY MASS INDEX DOCD: CPT | Performed by: PHYSICIAN ASSISTANT

## 2023-12-21 PROCEDURE — 1036F TOBACCO NON-USER: CPT | Performed by: PHYSICIAN ASSISTANT

## 2023-12-21 NOTE — PROGRESS NOTES
History of present illness status post left knee arthroscopy extensive debridement loose body removal      Physical exam:      General: No acute distress or breathing difficulty or discomfort, pleasant and cooperative with the examination.    Extremities: Left knee incisions are clean dry and intact minimal effusion range of motion from 0 to 130 degrees      Impression: Status post left knee arthroscopy partial meniscectomy loose body removal    Plan: Leave Steri-Strips for 1 week.  It is okay to shower.  Perform low impact range of motion physical therapy.  Follow-up in 2 weeks for range of motion wound check

## 2024-01-03 ENCOUNTER — TELEPHONE (OUTPATIENT)
Dept: ORTHOPEDIC SURGERY | Facility: HOSPITAL | Age: 24
End: 2024-01-03
Payer: COMMERCIAL

## 2024-01-08 ENCOUNTER — APPOINTMENT (OUTPATIENT)
Dept: PHYSICAL THERAPY | Facility: CLINIC | Age: 24
End: 2024-01-08
Payer: COMMERCIAL

## 2024-01-11 ENCOUNTER — OFFICE VISIT (OUTPATIENT)
Dept: ORTHOPEDIC SURGERY | Facility: CLINIC | Age: 24
End: 2024-01-11
Payer: COMMERCIAL

## 2024-01-11 DIAGNOSIS — M25.862 CYCLOPS LESION OF LEFT KNEE: Primary | ICD-10-CM

## 2024-01-11 DIAGNOSIS — S83.282A ACUTE LATERAL MENISCUS TEAR OF LEFT KNEE, INITIAL ENCOUNTER: ICD-10-CM

## 2024-01-11 PROCEDURE — 1036F TOBACCO NON-USER: CPT | Performed by: ORTHOPAEDIC SURGERY

## 2024-01-11 PROCEDURE — 99024 POSTOP FOLLOW-UP VISIT: CPT | Performed by: ORTHOPAEDIC SURGERY

## 2024-01-11 PROCEDURE — 3008F BODY MASS INDEX DOCD: CPT | Performed by: ORTHOPAEDIC SURGERY

## 2024-01-11 NOTE — PROGRESS NOTES
History of present illness 3 weeks status post left knee arthroscopy excision of cyclops lesion loose body removal and partial lateral meniscectomy.  Patient has minimal swelling and very little soreness.      Physical exam:      General: No acute distress or breathing difficulty or discomfort, pleasant and cooperative with the examination.    Extremities: Left knee incisions well-healed intact current range of motion is 0 to 140 degrees      Impression: Status post left knee arthroscopy    Plan: Patient will continue to work on quad conditioning we also recommend low impact exercises she could wear a brace we discussed and offered to measure her 1 but she will see if she can get one on her own.  She will follow-up with us on as-needed basis

## 2024-01-29 ENCOUNTER — APPOINTMENT (OUTPATIENT)
Dept: ORTHOPEDIC SURGERY | Facility: CLINIC | Age: 24
End: 2024-01-29
Payer: COMMERCIAL

## 2024-02-05 ENCOUNTER — APPOINTMENT (OUTPATIENT)
Dept: ORTHOPEDIC SURGERY | Facility: CLINIC | Age: 24
End: 2024-02-05
Payer: COMMERCIAL

## 2024-02-13 ENCOUNTER — OFFICE VISIT (OUTPATIENT)
Dept: OBSTETRICS AND GYNECOLOGY | Facility: CLINIC | Age: 24
End: 2024-02-13
Payer: COMMERCIAL

## 2024-02-13 VITALS
DIASTOLIC BLOOD PRESSURE: 70 MMHG | HEIGHT: 69 IN | WEIGHT: 234.9 LBS | BODY MASS INDEX: 34.79 KG/M2 | SYSTOLIC BLOOD PRESSURE: 102 MMHG

## 2024-02-13 DIAGNOSIS — Z12.39 SCREENING BREAST EXAMINATION: ICD-10-CM

## 2024-02-13 PROCEDURE — 1036F TOBACCO NON-USER: CPT | Performed by: ADVANCED PRACTICE MIDWIFE

## 2024-02-13 PROCEDURE — 99212 OFFICE O/P EST SF 10 MIN: CPT | Performed by: ADVANCED PRACTICE MIDWIFE

## 2024-02-13 PROCEDURE — 3008F BODY MASS INDEX DOCD: CPT | Performed by: ADVANCED PRACTICE MIDWIFE

## 2024-02-13 NOTE — PROGRESS NOTES
"GYNECOLOGY PROGRESS NOTE          CC:   see below.  Patient denies sickness. Patient states the dry skin lasted about 3 weeks and now is gone. The Lump in her Left armpit lasted 1 week and enlarged and then just went away.   Chief Complaint   Patient presents with    Breast Problem     Patient presents for issues for breast problems. Patient reports she found a dry patch on left breast. Patient then says a couple days later she had a bump under her left arm pit. Patient reports both have resolved at this time. Patient worried because ovarian cancer runs in her family.      HPI:  Violet Romanak is here with new complaint of breast breast mass and dry skin that resolved prior to this appointment.   Location of complaint is Left axilla .  Denies any associated nipple discharge or inversion, or  breast trauma.  Hx of breast biopsy/surgery:  none      ROS:  GYN - see HPI  DERM - see HPI      HISTORY:  Past Surgical History:   Procedure Laterality Date    OTHER SURGICAL HISTORY  08/01/2020    [place] tooth extraction    OTHER SURGICAL HISTORY Left 01/13/2022    Anterior cruciate ligament repair    TONSILLECTOMY       Cancer-related family history includes Breast cancer in an other family member.       PHYSICAL EXAM:  /70 (BP Location: Left arm, Patient Position: Sitting, BP Cuff Size: Large adult)   Ht 1.753 m (5' 9\")   Wt 107 kg (234 lb 14.4 oz)   LMP 02/11/2024   BMI 34.69 kg/m²   GEN:  A&O, NAD  BREAST:    -RT:  no masses or TTP, no skin lesions or nipple discharge  -LT:   no masses or TTP, no skin lesions or nipple discharge  LYMPH:  no axillary or supraclavicular lymphadenopathy  PSYCH:  normal affect, non-anxious      IMPRESSION/PLAN:    A: skin dryness and axillary mass resolved prior to this visit  Plan: 1. Sonogram if area occurs again. 2. Needs pap this year 2024.  Problem List Items Addressed This Visit    None  Visit Diagnoses       Screening breast examination                Miriam Seth, " APRN-CNM

## 2024-02-26 ENCOUNTER — TELEPHONE (OUTPATIENT)
Dept: ORTHOPEDIC SURGERY | Facility: HOSPITAL | Age: 24
End: 2024-02-26
Payer: COMMERCIAL

## 2024-03-04 ENCOUNTER — OFFICE VISIT (OUTPATIENT)
Dept: ORTHOPEDIC SURGERY | Facility: CLINIC | Age: 24
End: 2024-03-04
Payer: COMMERCIAL

## 2024-03-04 VITALS — WEIGHT: 235 LBS | HEIGHT: 69 IN | BODY MASS INDEX: 34.8 KG/M2

## 2024-03-04 DIAGNOSIS — M22.2X2 PATELLOFEMORAL PAIN SYNDROME OF LEFT KNEE: Primary | ICD-10-CM

## 2024-03-04 PROCEDURE — 3008F BODY MASS INDEX DOCD: CPT | Performed by: ORTHOPAEDIC SURGERY

## 2024-03-04 PROCEDURE — 1036F TOBACCO NON-USER: CPT | Performed by: ORTHOPAEDIC SURGERY

## 2024-03-04 PROCEDURE — 99203 OFFICE O/P NEW LOW 30 MIN: CPT | Performed by: ORTHOPAEDIC SURGERY

## 2024-03-04 PROCEDURE — 99213 OFFICE O/P EST LOW 20 MIN: CPT | Performed by: ORTHOPAEDIC SURGERY

## 2024-03-04 ASSESSMENT — PAIN SCALES - GENERAL: PAINLEVEL_OUTOF10: 0 - NO PAIN

## 2024-03-04 ASSESSMENT — PAIN - FUNCTIONAL ASSESSMENT: PAIN_FUNCTIONAL_ASSESSMENT: 0-10

## 2024-03-04 NOTE — PROGRESS NOTES
PRIMARY CARE PHYSICIAN: Sherice Bonilla MD PhD  REFERRING PROVIDER: No referring provider defined for this encounter.    New Patient Evaluation      SUBJECTIVE  CHIEF COMPLAINT:   Chief Complaint   Patient presents with    Left Knee - Pain        HPI: Violet Romanak is a 23 y.o. patient presenting for a new patient evaluation. Violet Romanak complains of popping in her left knee. She has a history of a left knee ACL reconstruction performed at an OSH 3 years ago with a quadriceps + supplemental allograft. She reports doing well after that surgery but a couple years later started to notice some popping in the left knee. She was evaluated by Dr. Hernandez who performed an left knee arthroscopy, removal of loose bodies, and debridement of the ACL which demonstrated fraying the 10% of the ACL graft (anterolateral bundle). Since that surgery she has noticed popping in the anterior knee, worse with going up/down stairs. She has also had issues with anterior knee pain after a long run. She enjoys horseback riding, running, and hiking. She works as a .      At todays visit, the patient reports that overall she is doing well. She has knee pain during prolonged activity and with deep squats. She is able to ride her horses without pain. She notes that the last operation seems to have resolved the popping/clicking she had in her knee.    The patient brought arthroscopy photos to review as well.    Patient is currently employed, and enjoys traveling, horseback riding and yoga.    REVIEW OF SYSTEMS  Constitutional: See HPI for pain assessment, No significant weight loss, recent trauma  Cardiovascular: No chest pain, shortness of breath  Respiratory: No difficulty breathing, cough  Gastrointestinal: No nausea, vomiting, diarrhea, constipation  Musculoskeletal: Noted in HPI, positive for pain, restricted motion, stiffness  Integumentary: No rashes, easy bruising, redness   Neurological: no numbness or tingling  in extremities, no gait disturbances   Psychiatric: No mood changes, memory changes, social issues  Heme/Lymph: no excessive swelling, easy bruising, excessive bleeding  ENT: No hearing changes  Eyes: No vision changes    Past Medical History:   Diagnosis Date    COVID-19     VACCINATED    Diphtheria, tetanus, acellular pertussis, and inactivated poliovirus vaccine (DTaP-IPV) administered 2000    DTaP/IPV/HBV vaccination 2000    DTaP/IPV/HBV vaccination 2000    DTaP/IPV/HBV vaccination 02/27/2003    Hepatitis B 2000    Hepatitis B 2000    Hepatitis B 03/22/2001    History of HIB vaccination 2000    History of HIB vaccination 2000    History of HIB vaccination 03/22/2001    History of tetanus, diphtheria, and acellular pertussis booster vaccination (Tdap) 02/29/2012    Influenza 12/22/2014    Meningococcal group B vaccine administered 02/22/2018    Meningococcal group B vaccine administered 02/22/2018    Other specified health status     Known health problems: none    Pneumococcal vaccine administered 01/18/2001    Pneumococcal vaccine administered 02/22/2001    Pneumococcal vaccine administered 04/26/2001    Unspecified symptoms and signs involving the genitourinary system 08/01/2020    UTI symptoms    Vaccine for human papilloma virus (HPV) types 6, 11, 16, and 18 administered 05/29/2012    Vaccine for human papilloma virus (HPV) types 6, 11, 16, and 18 administered 12/02/2014    Varicella vaccine 12/22/2014    Varicella vaccine 02/22/2018        Allergies   Allergen Reactions    Vancomycin Anaphylaxis        Past Surgical History:   Procedure Laterality Date    KNEE ARTHROSCOPY W/ DEBRIDEMENT Left 12/18/2023    LEFT Knee Loose Body Removal, synovectomy w/ Dr. Hernandez    OTHER SURGICAL HISTORY  08/01/2020    [place] tooth extraction    OTHER SURGICAL HISTORY Left 02/10/2021    Anterior cruciate ligament Reconstruction w/ quad Tendon & allograft    TONSILLECTOMY       "    Family History   Problem Relation Name Age of Onset    No Known Problems Mother      Breast cancer Other G/P     No Known Problems Other FAM HX         Social History     Socioeconomic History    Marital status: Single     Spouse name: Not on file    Number of children: Not on file    Years of education: Not on file    Highest education level: Not on file   Occupational History    Not on file   Tobacco Use    Smoking status: Never     Passive exposure: Never    Smokeless tobacco: Never   Vaping Use    Vaping Use: Never used   Substance and Sexual Activity    Alcohol use: Yes     Alcohol/week: 3.0 standard drinks of alcohol     Types: 1 Glasses of wine, 1 Cans of beer, 1 Shots of liquor per week     Comment: ONCE A WEEK    Drug use: Never    Sexual activity: Defer   Other Topics Concern    Not on file   Social History Narrative    Not on file     Social Determinants of Health     Financial Resource Strain: Not on file   Food Insecurity: Not on file   Transportation Needs: Not on file   Physical Activity: Not on file   Stress: Not on file   Social Connections: Not on file   Intimate Partner Violence: Not on file   Housing Stability: Not on file        CURRENT MEDICATIONS:   Current Outpatient Medications   Medication Sig Dispense Refill    norgestimate-ethinyl estradioL ([facility] Tri-Cyclen,Trinessa) 0.18/0.215/0.25 mg-35 mcg (28) tablet Take 1 tablet by mouth once daily. 28 tablet 12     No current facility-administered medications for this visit.        OBJECTIVE  PHYSICAL EXAM      12/18/2023     8:25 AM 12/18/2023     8:35 AM 12/18/2023     8:58 AM 12/18/2023     9:13 AM 12/18/2023     9:40 AM 2/13/2024    11:06 AM 3/4/2024     3:11 PM   Vitals   Systolic 124 114 105 104 105 102    Diastolic 72 70 66 71 65 70    Heart Rate 62 65 63 78 66     Temp  36.1 °C (97 °F) 36.2 °C (97.2 °F) 36.5 °C (97.7 °F) 36.3 °C (97.3 °F)     Resp 15 15 16 16 16     Height (in)      1.753 m (5' 9\") 1.753 m (5' 9\")   Weight (lb)      " 234.9 235   BMI      34.69 kg/m2 34.7 kg/m2   BSA (m2)      2.28 m2 2.28 m2   Visit Report      Report Report      Body mass index is 34.7 kg/m².      23 y.o. year-old in no acute distress. Well nourished. Normal affect. Alert and oriented x 3.     Gait: Normal Tandem. Neutral alignment. Able to perform single leg stance. No abnormalities of balance or coordination.  Skin: Intact over the bilateral upper and lower extremities. No erythema, ecchymosis, or temperature changes.    Right Knee:  ROM: 0-140 degrees. Negative crepitus.  No effusion.  Good quadriceps contraction. Intact extensor mechanism. Patellar tendon non-tender.  Patella facets non-tender. Negative apprehension. Negative tilt.   Lachman stable. Anterior drawer stable. Pivot negative. Posterior drawer negative.  Stable medial collateral ligament. Stable lateral collateral ligament.   Negative medial joint line tenderness.  Negative Razia's.  Negative lateral joint line tenderness.  Negative Razia's.       Left Knee:  ROM: 0-140 degrees. Positive crepitus.  No effusion.  Reduced quadriceps contraction. Intact extensor mechanism. Patellar tendon non-tender.  Patella facets non-tender. Negative apprehension. Negative tilt.   Lachman Grade 1A. Anterior drawer stable. Pivot negative. Posterior drawer negative.  Stable medial collateral ligament. Stable lateral collateral ligament.   Negative medial joint line tenderness. Negative lateral joint line tenderness.  Negative Razia's.    Motor Strength: 5 out of 5 in the bilateral lower extremities.  Neuro: L4-S1 sensation intact grossly bilaterally. No clonus. 2+ and symmetric Patella and Achilles bilateral reflexes.  Vascular: 2+ DP/PT pulses bilaterally. Bilateral lower extremity compartments supple.    Left knee MRI and Arthroscopy were reviewed, showing arthrofibrosis and fraying of around 10% of the ACL with the majority of the ACL intact    ASSESSMENT & PLAN    Impression: 23 y.o. female with left  knee patellofemoral pain    Plan:  The patient is diagnosed with left knee patellofemoral pain. On exam, the patient has a hard endpoint to her Lachman and the ACL seems intact despite some minor fraying on arthroscopy imaging.    Arthroscopy photos and MRI indicate that a majority of her ACL is intact. The patient also denies any episodes of instability on history, supporting these findings.     The patients symptoms are most likely driven by reduced quadriceps strength leading to patellar mal-tracking. For this, the patient would benefit from PT both for functional testing and to recover her quadriceps strengthening.    Follow-Up: Patient will follow-up following PT.    At the end of the visit, all questions were answered in full. The patient is in agreement with the plan and recommendations. They will call the office with any questions/concerns.      Note dictated with [employer] software. Completed without full typed error editing and sent to avoid delay.

## 2024-03-13 ENCOUNTER — EVALUATION (OUTPATIENT)
Dept: PHYSICAL THERAPY | Facility: CLINIC | Age: 24
End: 2024-03-13
Payer: COMMERCIAL

## 2024-03-13 DIAGNOSIS — G89.29 CHRONIC PAIN OF LEFT KNEE: ICD-10-CM

## 2024-03-13 DIAGNOSIS — S83.519D RUPTURE OF ANTERIOR CRUCIATE LIGAMENT OF KNEE, SUBSEQUENT ENCOUNTER: Primary | ICD-10-CM

## 2024-03-13 DIAGNOSIS — G89.29 CHRONIC PATELLOFEMORAL PAIN OF LEFT KNEE: ICD-10-CM

## 2024-03-13 DIAGNOSIS — M22.2X2 PATELLOFEMORAL PAIN SYNDROME OF LEFT KNEE: ICD-10-CM

## 2024-03-13 DIAGNOSIS — M62.81 QUADRICEPS WEAKNESS: ICD-10-CM

## 2024-03-13 DIAGNOSIS — M25.562 CHRONIC PATELLOFEMORAL PAIN OF LEFT KNEE: ICD-10-CM

## 2024-03-13 DIAGNOSIS — M25.562 CHRONIC PAIN OF LEFT KNEE: ICD-10-CM

## 2024-03-13 PROCEDURE — 97161 PT EVAL LOW COMPLEX 20 MIN: CPT | Mod: GP | Performed by: PHYSICAL THERAPIST

## 2024-03-13 PROCEDURE — 97110 THERAPEUTIC EXERCISES: CPT | Mod: GP | Performed by: PHYSICAL THERAPIST

## 2024-03-13 ASSESSMENT — PAIN SCALES - GENERAL: PAINLEVEL_OUTOF10: 0 - NO PAIN

## 2024-03-13 ASSESSMENT — PAIN - FUNCTIONAL ASSESSMENT: PAIN_FUNCTIONAL_ASSESSMENT: 0-10

## 2024-03-13 NOTE — PROGRESS NOTES
Physical Therapy      Physical Therapy  Physical Therapy Orthopedic Evaluation    Patient Name: Violet Romanak  MRN: [id number]  Today's Date: 3/13/2024  Time Calculation  Start Time: 0930  Stop Time: 1055  Time Calculation (min): 85 min    Insurance:  Visit number: 1 of 1  Authorization info: 3/13/24-3/27/24  Insurance Type: [employer]    General:  Reason for visit: L patellofemoral pain s/p ACLR and cyclops lesion removal  Referred by: Martha    Current Problem:  1. Rupture of anterior cruciate ligament of knee, subsequent encounter        2. Patellofemoral pain syndrome of left knee  Referral to Physical Therapy      3. Chronic pain of left knee        4. Chronic patellofemoral pain of left knee        5. Quadriceps weakness            Precautions: None to therapy         Medical History Form: Reviewed (scanned into chart)    Subjective:     Chief Complaint: Patient presents to clinic s/p ACLR w/ meniscal repair in February of 2021 as well as synovectomy to clear a loose body in the L knee. Pt has been running up to 3 miles outside of the clinic and is complaining of some anterior/posterior knee pain with deep squatting and long distance running on uneven ground.    Onset Date: February 2021/December 18, 2023  FRANCY: Overuse    Current Condition:   Better    Pain:  Pain Assessment: 0-10  Pain Score: 0 - No pain  Location: anterior knee under patella and behind the knee as well   Description: not as sharp as typical sharpness pain, around 5/10 when elevating out of a deep squat and 1-2/10 when in deep squat  Aggravating Factors:  Kneeling, Standing, and Squatting  Relieving Factors:  Rest    Relevant Information (PMH & Previous Tests/Imaging): ACLR and Menisucus repair February 2021, Cyclops lesion removal 2023  Previous Interventions/Treatments: Physical Therapy    Prior Level of Function (PLOF)  Patient previously independent with all ADLs  Exercise/Physical Activity: Running (trail running but typically on pavement) and  horseback riding.   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     Patients Living Environment: Reviewed and no concern    Primary Language: English    Patient's Goal(s) for Therapy: Learn more strengthening exercises and learning more information about the knee.     Red Flags: Do you have any of the following? Yes  Fever/chills, unexplained weight changes, dizziness/fainting, unexplained change in bowel or bladder functions, unexplained malaise or muscle weakness, night pain/sweats, numbness or tingling  Yellow Flags: Clear    Objective:    KNEE    Observation   1+ effusion on stroke test of L knee  Knee Palpation/Joint Mobility   Not TTP on any points around L knee, no tightness noted in L patellar tendon compared to R.     Patellar Mobility: minimally restricted medial glide on LLE  Tibiofemoral mobility: Hypermobile PA/Anterior drawer, all other motions WNL  Knee AROM   R: 8-0-145  L: 3-0-145  Knee PROM   Increased RLE hyperextension to roughly 12d, LLE limited in extension but around 8d  Knee MMT   R/L /5  Quadriceps: 4/4-  Hamstrings: 4/4  Hip Flexion: 4/4-  Special Tests   Anterior Drawer: increased motion in LLE, non-painful  Valgus/Varus Stress Test: (-)  Razia's: (-)  Gait   Painless, normal gait noted.    LE Y-Balance Test        Pass: Partially      Left Right Difference* Limb Length:   (ASIS to med mal)   Anterior 53 /   52   / 54   56 /   53   / 52   3cm Left Right   Posteromedial 75 /   74   / 79   77 /   68   / 78   0cm x x   Posterolateral 88 /   82   / 84   88 /   77   / 82   0cm   Composite Score^ x x x   3 trials, record maximal reach in each direction  *Difference should be less than 4cm for return to sport; <4 cm = pass  ^Composite Score= (Medial + posteromedial + posterolateral)/(3x limb length)  X  100    Strength Testing (Isokinetic or HHD)    Isokinetic Testing performed. See scanned in results in patient chart.     Considerations: LSI >=90% to pass  HS:Q  ">75% ?>65% ?  *Isokinetic must be completed for full clearance!*    60/60 d/s (R/L)  Quad: 120/91, 24% deficit, 52%/40% BW  Hamstrin/49, 17% deficit, 26%/21%    180/180 d/s (R/L)  Quad: 68/56, 18% deficit  Hamstrin/31, 3% deficit        Lower Extremity Functional Movements  Transfers: Normal and painless  Gait: Normal and painless, jogging/running w/o pain  SL Balance: Balancing for 10s both sides  DL Squat: Normal depth and pain free, 2/10 at bottom of deep squat and 5/10 pain when raising out of it  Heel Tap: 20 completed on R on 8\" box, 20 completed on L on 6\" box    Outcome Measures:  Other Measures  Lower Extremity Funtional Score (LEFS): 76       EDUCATION: Home exercise program, plan of care, activity modifications, pain management, and injury pathology       Goals: No goals set or discussed today secondary to evaluation only with HEP requested.    Plan of care was developed with input and agreement by the patient    Treatment Performed:    Therapeutic Exercise:    40 min  DL Knee Extension 40# 3*12  Isokinetic testing 60 d/s, 180d/s  Y-balance testing  SL heel taps victorino.   DL Goblet Squats 35# kb 3*8  SL RDL 15# kb 3*8 victorino.  DL iso squat w/ heels elevated 3*30s  Edu on soreness rules    Assessment: Patient presents with signs and symptoms consistent with bilateral quadriceps and hamstring weakness s/p ACLR and synovectomy, resulting in limited participation in pain-free ADLs and inability to perform at their prior level of function. Pt demonstrates deficits in quadriceps and hamstrings strength of 24% and 17% at 60 d/s and 18% and 3% at 180 d/s. She demonstrates %BW below level of expected. Special testing is negative and pt was in agreement to work through comprehensive HEP and return to strength training to address limitations found during evaluation.       Plan:  Currently plan to discharge from formal PT with quadriceps and hamstrings exercise prescriptions w/ strength training dosing and " working outside of the clinic to reach goals. Pt was given contact information if L anterior knee pain persists or if any questions come up outside of the clinic.       DEIDRE LANG, S-PT

## 2024-03-18 ENCOUNTER — APPOINTMENT (OUTPATIENT)
Dept: ORTHOPEDIC SURGERY | Facility: CLINIC | Age: 24
End: 2024-03-18
Payer: COMMERCIAL

## 2024-03-19 ENCOUNTER — APPOINTMENT (OUTPATIENT)
Dept: PHYSICAL THERAPY | Facility: CLINIC | Age: 24
End: 2024-03-19
Payer: COMMERCIAL

## 2024-04-09 ENCOUNTER — OFFICE VISIT (OUTPATIENT)
Dept: PRIMARY CARE | Facility: CLINIC | Age: 24
End: 2024-04-09
Payer: COMMERCIAL

## 2024-04-09 VITALS
RESPIRATION RATE: 16 BRPM | SYSTOLIC BLOOD PRESSURE: 122 MMHG | HEART RATE: 78 BPM | BODY MASS INDEX: 34.77 KG/M2 | DIASTOLIC BLOOD PRESSURE: 80 MMHG | WEIGHT: 229.4 LBS | HEIGHT: 68 IN | OXYGEN SATURATION: 99 % | TEMPERATURE: 98 F

## 2024-04-09 DIAGNOSIS — E78.2 MIXED HYPERLIPIDEMIA: Primary | ICD-10-CM

## 2024-04-09 DIAGNOSIS — Z00.00 HEALTHCARE MAINTENANCE: ICD-10-CM

## 2024-04-09 DIAGNOSIS — E66.09 CLASS 1 OBESITY DUE TO EXCESS CALORIES WITHOUT SERIOUS COMORBIDITY WITH BODY MASS INDEX (BMI) OF 34.0 TO 34.9 IN ADULT: ICD-10-CM

## 2024-04-09 PROCEDURE — 1036F TOBACCO NON-USER: CPT | Performed by: INTERNAL MEDICINE

## 2024-04-09 PROCEDURE — 99214 OFFICE O/P EST MOD 30 MIN: CPT | Performed by: INTERNAL MEDICINE

## 2024-04-09 PROCEDURE — 3008F BODY MASS INDEX DOCD: CPT | Performed by: INTERNAL MEDICINE

## 2024-04-09 ASSESSMENT — PATIENT HEALTH QUESTIONNAIRE - PHQ9
2. FEELING DOWN, DEPRESSED OR HOPELESS: NOT AT ALL
SUM OF ALL RESPONSES TO PHQ9 QUESTIONS 1 AND 2: 0
1. LITTLE INTEREST OR PLEASURE IN DOING THINGS: NOT AT ALL

## 2024-04-09 ASSESSMENT — ENCOUNTER SYMPTOMS
ACTIVITY CHANGE: 0
JOINT SWELLING: 0
POLYPHAGIA: 0
NUMBNESS: 0
CHEST TIGHTNESS: 0
EYE PAIN: 0
ADENOPATHY: 0
DIFFICULTY URINATING: 0
TROUBLE SWALLOWING: 0
APPETITE CHANGE: 0
SEIZURES: 0
SINUS PRESSURE: 0
NAUSEA: 0
VOMITING: 0
COUGH: 0
MUSCULOSKELETAL NEGATIVE: 1
CONSTIPATION: 0
SORE THROAT: 0
FACIAL ASYMMETRY: 0
ENDOCRINE NEGATIVE: 1
DIARRHEA: 0
FREQUENCY: 0
BLOOD IN STOOL: 0
ARTHRALGIAS: 0
MYALGIAS: 0
UNEXPECTED WEIGHT CHANGE: 0
VOICE CHANGE: 0
EYES NEGATIVE: 1
PSYCHIATRIC NEGATIVE: 1
HALLUCINATIONS: 0
ABDOMINAL PAIN: 0
CONSTITUTIONAL NEGATIVE: 1
RESPIRATORY NEGATIVE: 1
SINUS PAIN: 0
COLOR CHANGE: 0
SHORTNESS OF BREATH: 0
CARDIOVASCULAR NEGATIVE: 1
ALLERGIC/IMMUNOLOGIC NEGATIVE: 1
NEUROLOGICAL NEGATIVE: 1
TREMORS: 0
NERVOUS/ANXIOUS: 0
NECK STIFFNESS: 0
DIZZINESS: 0
HEMATOLOGIC/LYMPHATIC NEGATIVE: 1
EYE REDNESS: 0
STRIDOR: 0
WHEEZING: 0
WOUND: 0
WEAKNESS: 0
SPEECH DIFFICULTY: 0
HEADACHES: 0
AGITATION: 0
DYSURIA: 0
LIGHT-HEADEDNESS: 0
FLANK PAIN: 0
POLYDIPSIA: 0
BACK PAIN: 0
NECK PAIN: 0
GASTROINTESTINAL NEGATIVE: 1
SLEEP DISTURBANCE: 0
PALPITATIONS: 0
BRUISES/BLEEDS EASILY: 0
EYE DISCHARGE: 0
CONFUSION: 0

## 2024-04-09 NOTE — PROGRESS NOTES
Subjective   Patient ID: Violet Romanak is a 24 y.o. female who presents for Follow-up (Patient is here today for a 6 month follow up).  HPI    Patient has been feeling pretty good and has been complaint with prescribed medications.      She sustained left knee ACL rupture and required ACL repair.  Surgery was done in [place] 2023, by Dr. Jimmy Hernandez.  She recovered well, completed PT, now exercising on her own.  I reviewed surgery report and recent visit notes with ortho.    She has been trying to lose weight, following calorie restrictive meal plan.  We discussed this subject at length, low carbohydrate and low calorie diet advised with goal of less than 1750 harley/day and 100 g carbohydrates per day.    We reviewed and discussed details of  blood work: CBC, CMP, TSH, Lipid panel done in 2023.  Results within acceptable range.  Elevated cholesterol noted.       She is planing to stop BCP for a while.  Follows with GYN.      Review of Systems   Constitutional: Negative.  Negative for activity change, appetite change and unexpected weight change.   HENT: Negative.  Negative for congestion, ear discharge, ear pain, hearing loss, mouth sores, nosebleeds, sinus pressure, sinus pain, sore throat, trouble swallowing and voice change.    Eyes: Negative.  Negative for pain, discharge, redness and visual disturbance.   Respiratory: Negative.  Negative for cough, chest tightness, shortness of breath, wheezing and stridor.    Cardiovascular: Negative.  Negative for chest pain, palpitations and leg swelling.   Gastrointestinal: Negative.  Negative for abdominal pain, blood in stool, constipation, diarrhea, nausea and vomiting.   Endocrine: Negative.  Negative for polydipsia, polyphagia and polyuria.   Genitourinary: Negative.  Negative for difficulty urinating, dysuria, flank pain, frequency and urgency.   Musculoskeletal: Negative.  Negative for arthralgias, back pain, gait problem, joint swelling, myalgias, neck pain and neck  "stiffness.   Skin: Negative.  Negative for color change, rash and wound.   Allergic/Immunologic: Negative.  Negative for environmental allergies, food allergies and immunocompromised state.   Neurological: Negative.  Negative for dizziness, tremors, seizures, syncope, facial asymmetry, speech difficulty, weakness, light-headedness, numbness and headaches.   Hematological: Negative.  Negative for adenopathy. Does not bruise/bleed easily.   Psychiatric/Behavioral: Negative.  Negative for agitation, behavioral problems, confusion, hallucinations, sleep disturbance and suicidal ideas. The patient is not nervous/anxious.    All other systems reviewed and are negative.      Objective     Review of systems was performed and all systems were negative except what in HPI    /80 (BP Location: Left arm, Patient Position: Sitting, BP Cuff Size: Adult)   Pulse 78   Temp 36.7 °C (98 °F) (Temporal)   Resp 16   Ht 1.727 m (5' 8\")   Wt 104 kg (229 lb 6.4 oz)   SpO2 99%   BMI 34.88 kg/m²    Physical Exam  Vitals and nursing note reviewed.   Constitutional:       General: She is not in acute distress.     Appearance: Normal appearance.   HENT:      Head: Normocephalic and atraumatic.      Right Ear: External ear normal.      Left Ear: External ear normal.      Nose: Nose normal. No congestion or rhinorrhea.   Eyes:      General:         Right eye: No discharge.         Left eye: No discharge.      Extraocular Movements: Extraocular movements intact.      Conjunctiva/sclera: Conjunctivae normal.      Pupils: Pupils are equal, round, and reactive to light.   Cardiovascular:      Rate and Rhythm: Normal rate and regular rhythm.      Pulses: Normal pulses.      Heart sounds: Normal heart sounds. No murmur heard.     No friction rub. No gallop.   Pulmonary:      Effort: Pulmonary effort is normal. No respiratory distress.      Breath sounds: Normal breath sounds. No stridor. No wheezing, rhonchi or rales.   Chest:      Chest " wall: No tenderness.   Abdominal:      General: Bowel sounds are normal.      Palpations: Abdomen is soft. There is no mass.      Tenderness: There is no abdominal tenderness. There is no guarding or rebound.   Musculoskeletal:         General: No swelling or deformity. Normal range of motion.      Cervical back: Normal range of motion and neck supple. No rigidity or tenderness.      Right lower leg: No edema.      Left lower leg: No edema.   Lymphadenopathy:      Cervical: No cervical adenopathy.   Skin:     General: Skin is warm and dry.      Coloration: Skin is not jaundiced.      Findings: No bruising or erythema.   Neurological:      General: No focal deficit present.      Mental Status: She is alert and oriented to person, place, and time. Mental status is at baseline.      Cranial Nerves: No cranial nerve deficit.      Motor: No weakness.      Coordination: Coordination normal.      Gait: Gait normal.   Psychiatric:         Mood and Affect: Mood normal.         Behavior: Behavior normal.         Thought Content: Thought content normal.         Judgment: Judgment normal.         Assessment/Plan   Problem List Items Addressed This Visit             ICD-10-CM       Cardiac and Vasculature    Hyperlipidemia - Primary E78.5     Low cholesterol diet is advised.             Endocrine/Metabolic    Class 1 obesity due to excess calories without serious comorbidity with body mass index (BMI) of 34.0 to 34.9 in adult E66.09, Z68.34     Weight loss is advised. Target BMI: 25. Please follow low carbohydrate diet and exercise at least 150 minutes weekly.  Nutritional consultation is available, please let me know if interested.           Other Visit Diagnoses         Codes    Healthcare maintenance     Z00.00    Relevant Orders    CBC    Comprehensive Metabolic Panel    Lipid Panel    TSH with reflex to Free T4 if abnormal        It was a pleasure to see you today.  I would like to remind you about importance of a healthy  lifestyle in order to improve your well-being and live longer.  Try to engage in physical activities for at least 150 minutes per week.  Eat about 10 servings of fruits and vegetables daily. My advice is 2 servings of fruits and 8 servings of vegetables.  For vegetables choose at least half of them green and at least half of them fresh.  Please avoid sugar, salt, fried food and saturated fat.    I spent a total of 30 minutes on the date of service for follow up visit, which included preparing to see the patient, face-to-face patient care, completing clinical documentation, obtaining and/or reviewing separately obtained history, performing a medically appropriate examination, counseling and educating the patient/family/caregiver, ordering medications, tests, or procedures, communicating with other health care providers (not separately reported), independently interpreting results (not separately reported), communicating results to the patient/family/caregiver, and care coordination (not separately reported).    F/up in 6 months for CPE or sooner if needed.

## 2024-06-24 DIAGNOSIS — Z30.09 ENCOUNTER FOR OTHER GENERAL COUNSELING OR ADVICE ON CONTRACEPTION: ICD-10-CM

## 2024-06-24 RX ORDER — NORGESTIMATE AND ETHINYL ESTRADIOL 7DAYSX3 28
1 KIT ORAL DAILY
Qty: 28 TABLET | Refills: 0 | OUTPATIENT
Start: 2024-06-24 | End: 2025-06-24

## 2024-08-28 ENCOUNTER — LAB REQUISITION (OUTPATIENT)
Dept: LAB | Facility: HOSPITAL | Age: 24
End: 2024-08-28
Payer: COMMERCIAL

## 2024-08-28 DIAGNOSIS — Z11.3 ENCOUNTER FOR SCREENING FOR INFECTIONS WITH A PREDOMINANTLY SEXUAL MODE OF TRANSMISSION: ICD-10-CM

## 2024-08-28 PROCEDURE — 87591 N.GONORRHOEAE DNA AMP PROB: CPT

## 2024-08-28 PROCEDURE — 87491 CHLMYD TRACH DNA AMP PROBE: CPT

## 2024-08-28 PROCEDURE — 87661 TRICHOMONAS VAGINALIS AMPLIF: CPT

## 2024-08-29 LAB
C TRACH RRNA SPEC QL NAA+PROBE: NEGATIVE
N GONORRHOEA DNA SPEC QL PROBE+SIG AMP: NEGATIVE
T VAGINALIS RRNA SPEC QL NAA+PROBE: NEGATIVE

## 2024-09-27 ENCOUNTER — LAB (OUTPATIENT)
Dept: LAB | Facility: LAB | Age: 24
End: 2024-09-27
Payer: COMMERCIAL

## 2024-09-27 DIAGNOSIS — Z00.00 HEALTHCARE MAINTENANCE: ICD-10-CM

## 2024-09-27 DIAGNOSIS — Z11.3 ENCOUNTER FOR SCREENING FOR INFECTIONS WITH A PREDOMINANTLY SEXUAL MODE OF TRANSMISSION: Primary | ICD-10-CM

## 2024-09-27 LAB
ALBUMIN SERPL BCP-MCNC: 4.4 G/DL (ref 3.4–5)
ALP SERPL-CCNC: 60 U/L (ref 33–110)
ALT SERPL W P-5'-P-CCNC: 19 U/L (ref 7–45)
ANION GAP SERPL CALC-SCNC: 13 MMOL/L (ref 10–20)
AST SERPL W P-5'-P-CCNC: 15 U/L (ref 9–39)
BILIRUB SERPL-MCNC: 0.6 MG/DL (ref 0–1.2)
BUN SERPL-MCNC: 18 MG/DL (ref 6–23)
CALCIUM SERPL-MCNC: 9.8 MG/DL (ref 8.6–10.6)
CHLORIDE SERPL-SCNC: 103 MMOL/L (ref 98–107)
CHOLEST SERPL-MCNC: 179 MG/DL (ref 0–199)
CHOLESTEROL/HDL RATIO: 2.6
CO2 SERPL-SCNC: 28 MMOL/L (ref 21–32)
CREAT SERPL-MCNC: 0.86 MG/DL (ref 0.5–1.05)
EGFRCR SERPLBLD CKD-EPI 2021: >90 ML/MIN/1.73M*2
ERYTHROCYTE [DISTWIDTH] IN BLOOD BY AUTOMATED COUNT: 12.2 % (ref 11.5–14.5)
GLUCOSE SERPL-MCNC: 83 MG/DL (ref 74–99)
HCT VFR BLD AUTO: 44.5 % (ref 36–46)
HDLC SERPL-MCNC: 69.5 MG/DL
HGB BLD-MCNC: 14.7 G/DL (ref 12–16)
HIV 1+2 AB+HIV1 P24 AG SERPL QL IA: NONREACTIVE
LDLC SERPL CALC-MCNC: 97 MG/DL
MCH RBC QN AUTO: 30.6 PG (ref 26–34)
MCHC RBC AUTO-ENTMCNC: 33 G/DL (ref 32–36)
MCV RBC AUTO: 93 FL (ref 80–100)
NON HDL CHOLESTEROL: 110 MG/DL (ref 0–149)
NRBC BLD-RTO: 0 /100 WBCS (ref 0–0)
PLATELET # BLD AUTO: 260 X10*3/UL (ref 150–450)
POTASSIUM SERPL-SCNC: 3.9 MMOL/L (ref 3.5–5.3)
PROT SERPL-MCNC: 7 G/DL (ref 6.4–8.2)
RBC # BLD AUTO: 4.8 X10*6/UL (ref 4–5.2)
SODIUM SERPL-SCNC: 140 MMOL/L (ref 136–145)
TREPONEMA PALLIDUM IGG+IGM AB [PRESENCE] IN SERUM OR PLASMA BY IMMUNOASSAY: NONREACTIVE
TRIGL SERPL-MCNC: 61 MG/DL (ref 0–149)
TSH SERPL-ACNC: 2.29 MIU/L (ref 0.44–3.98)
VLDL: 12 MG/DL (ref 0–40)
WBC # BLD AUTO: 5.6 X10*3/UL (ref 4.4–11.3)

## 2024-09-27 PROCEDURE — 36415 COLL VENOUS BLD VENIPUNCTURE: CPT

## 2024-09-27 PROCEDURE — 87389 HIV-1 AG W/HIV-1&-2 AB AG IA: CPT

## 2024-09-27 PROCEDURE — 80061 LIPID PANEL: CPT

## 2024-09-27 PROCEDURE — 86780 TREPONEMA PALLIDUM: CPT

## 2024-09-27 PROCEDURE — 84443 ASSAY THYROID STIM HORMONE: CPT

## 2024-09-27 PROCEDURE — 85027 COMPLETE CBC AUTOMATED: CPT

## 2024-09-27 PROCEDURE — 80053 COMPREHEN METABOLIC PANEL: CPT

## 2024-10-09 ENCOUNTER — APPOINTMENT (OUTPATIENT)
Dept: PRIMARY CARE | Facility: CLINIC | Age: 24
End: 2024-10-09
Payer: COMMERCIAL

## 2024-10-15 ENCOUNTER — APPOINTMENT (OUTPATIENT)
Dept: PRIMARY CARE | Facility: CLINIC | Age: 24
End: 2024-10-15
Payer: COMMERCIAL

## 2024-10-15 VITALS
BODY MASS INDEX: 32.1 KG/M2 | TEMPERATURE: 98 F | WEIGHT: 211.8 LBS | RESPIRATION RATE: 16 BRPM | HEIGHT: 68 IN | DIASTOLIC BLOOD PRESSURE: 60 MMHG | SYSTOLIC BLOOD PRESSURE: 120 MMHG | OXYGEN SATURATION: 98 % | HEART RATE: 69 BPM

## 2024-10-15 DIAGNOSIS — Z01.419 WELL FEMALE EXAM WITH ROUTINE GYNECOLOGICAL EXAM: ICD-10-CM

## 2024-10-15 DIAGNOSIS — Z00.00 HEALTHCARE MAINTENANCE: Primary | ICD-10-CM

## 2024-10-15 DIAGNOSIS — Z23 NEED FOR INFLUENZA VACCINATION: ICD-10-CM

## 2024-10-15 PROCEDURE — 3008F BODY MASS INDEX DOCD: CPT | Performed by: INTERNAL MEDICINE

## 2024-10-15 PROCEDURE — 90656 IIV3 VACC NO PRSV 0.5 ML IM: CPT | Performed by: INTERNAL MEDICINE

## 2024-10-15 PROCEDURE — 90471 IMMUNIZATION ADMIN: CPT | Performed by: INTERNAL MEDICINE

## 2024-10-15 PROCEDURE — 99395 PREV VISIT EST AGE 18-39: CPT | Performed by: INTERNAL MEDICINE

## 2024-10-15 ASSESSMENT — ENCOUNTER SYMPTOMS
SINUS PAIN: 0
APPETITE CHANGE: 0
DYSURIA: 0
WOUND: 0
GASTROINTESTINAL NEGATIVE: 1
NECK STIFFNESS: 0
ENDOCRINE NEGATIVE: 1
SHORTNESS OF BREATH: 0
TROUBLE SWALLOWING: 0
POLYDIPSIA: 0
PSYCHIATRIC NEGATIVE: 1
COLOR CHANGE: 0
CONFUSION: 0
UNEXPECTED WEIGHT CHANGE: 0
LIGHT-HEADEDNESS: 0
NAUSEA: 0
CONSTITUTIONAL NEGATIVE: 1
ACTIVITY CHANGE: 0
POLYPHAGIA: 0
FLANK PAIN: 0
TREMORS: 0
ADENOPATHY: 0
JOINT SWELLING: 0
VOICE CHANGE: 0
EYES NEGATIVE: 1
NEUROLOGICAL NEGATIVE: 1
MYALGIAS: 0
ABDOMINAL PAIN: 0
EYE DISCHARGE: 0
DIFFICULTY URINATING: 0
CARDIOVASCULAR NEGATIVE: 1
ALLERGIC/IMMUNOLOGIC NEGATIVE: 1
PALPITATIONS: 0
VOMITING: 0
STRIDOR: 0
FREQUENCY: 0
NUMBNESS: 0
NERVOUS/ANXIOUS: 0
EYE PAIN: 0
ARTHRALGIAS: 0
DIARRHEA: 0
SINUS PRESSURE: 0
HEMATOLOGIC/LYMPHATIC NEGATIVE: 1
RESPIRATORY NEGATIVE: 1
AGITATION: 0
BACK PAIN: 0
CONSTIPATION: 0
SEIZURES: 0
COUGH: 0
DIZZINESS: 0
NECK PAIN: 0
HALLUCINATIONS: 0
WHEEZING: 0
MUSCULOSKELETAL NEGATIVE: 1
SLEEP DISTURBANCE: 0
CHEST TIGHTNESS: 0
HEADACHES: 0
SORE THROAT: 0
SPEECH DIFFICULTY: 0
FACIAL ASYMMETRY: 0
WEAKNESS: 0
BRUISES/BLEEDS EASILY: 0
BLOOD IN STOOL: 0
EYE REDNESS: 0

## 2024-10-15 ASSESSMENT — PATIENT HEALTH QUESTIONNAIRE - PHQ9
2. FEELING DOWN, DEPRESSED OR HOPELESS: NOT AT ALL
1. LITTLE INTEREST OR PLEASURE IN DOING THINGS: NOT AT ALL
SUM OF ALL RESPONSES TO PHQ9 QUESTIONS 1 AND 2: 0

## 2024-10-15 NOTE — PROGRESS NOTES
Subjective   Patient ID: Violet Romanak is a 24 y.o. female who presents for Annual Exam (Pt is here due to annual physical , pt stated she didn't get her period this month , stopped taking birth control in April / may ).  HPI    Patient has been feeling pretty good and has been complaint with prescribed medications.    We reviewed and discussed details of recent blood work: CBC, CMP, TSH, Lipid panel done in 2024.  Results within acceptable range.    Intentionally lost 18 lbs, previously elevated cholesterol normalized.    C/o irregular periods, stopped BCP few months ago, not pregnant, I advised to f/up with GYN    PAP: as per GYN    Denies tobacco products, no recreational drugs.     She sustained left knee ACL rupture and required ACL repair.  Surgery was done in Dec 2023, by Dr. Jimmy Hernandez.  She recovered well, completed PT, now exercising on her own.      Review of Systems   Constitutional: Negative.  Negative for activity change, appetite change and unexpected weight change.   HENT: Negative.  Negative for congestion, ear discharge, ear pain, hearing loss, mouth sores, nosebleeds, sinus pressure, sinus pain, sore throat, trouble swallowing and voice change.    Eyes: Negative.  Negative for pain, discharge, redness and visual disturbance.   Respiratory: Negative.  Negative for cough, chest tightness, shortness of breath, wheezing and stridor.    Cardiovascular: Negative.  Negative for chest pain, palpitations and leg swelling.   Gastrointestinal: Negative.  Negative for abdominal pain, blood in stool, constipation, diarrhea, nausea and vomiting.   Endocrine: Negative.  Negative for polydipsia, polyphagia and polyuria.   Genitourinary: Negative.  Negative for difficulty urinating, dysuria, flank pain, frequency and urgency.   Musculoskeletal: Negative.  Negative for arthralgias, back pain, gait problem, joint swelling, myalgias, neck pain and neck stiffness.   Skin: Negative.  Negative for color change,  "rash and wound.   Allergic/Immunologic: Negative.  Negative for environmental allergies, food allergies and immunocompromised state.   Neurological: Negative.  Negative for dizziness, tremors, seizures, syncope, facial asymmetry, speech difficulty, weakness, light-headedness, numbness and headaches.   Hematological: Negative.  Negative for adenopathy. Does not bruise/bleed easily.   Psychiatric/Behavioral: Negative.  Negative for agitation, behavioral problems, confusion, hallucinations, sleep disturbance and suicidal ideas. The patient is not nervous/anxious.    All other systems reviewed and are negative.      Objective     Review of systems was performed and all systems were negative except what in HPI    /60 (BP Location: Left arm, Patient Position: Sitting, BP Cuff Size: Adult)   Pulse 69   Temp 36.7 °C (98 °F) (Temporal)   Resp 16   Ht 1.727 m (5' 8\")   Wt 96.1 kg (211 lb 12.8 oz)   SpO2 98%   BMI 32.20 kg/m²    Physical Exam  Vitals and nursing note reviewed.   Constitutional:       General: She is not in acute distress.     Appearance: Normal appearance.   HENT:      Head: Normocephalic and atraumatic.      Right Ear: Tympanic membrane, ear canal and external ear normal.      Left Ear: Tympanic membrane, ear canal and external ear normal.      Nose: Nose normal. No congestion or rhinorrhea.      Mouth/Throat:      Mouth: Mucous membranes are moist.      Pharynx: Oropharynx is clear.   Eyes:      General:         Right eye: No discharge.         Left eye: No discharge.      Extraocular Movements: Extraocular movements intact.      Conjunctiva/sclera: Conjunctivae normal.      Pupils: Pupils are equal, round, and reactive to light.   Cardiovascular:      Rate and Rhythm: Normal rate and regular rhythm.      Pulses: Normal pulses.      Heart sounds: Normal heart sounds. No murmur heard.     No friction rub. No gallop.   Pulmonary:      Effort: Pulmonary effort is normal. No respiratory distress.    "   Breath sounds: Normal breath sounds. No stridor. No wheezing, rhonchi or rales.   Chest:      Chest wall: No tenderness.   Abdominal:      General: Bowel sounds are normal.      Palpations: Abdomen is soft. There is no mass.      Tenderness: There is no abdominal tenderness. There is no guarding or rebound.   Musculoskeletal:         General: No swelling or deformity. Normal range of motion.      Cervical back: Normal range of motion and neck supple. No rigidity or tenderness.      Right lower leg: No edema.      Left lower leg: No edema.   Lymphadenopathy:      Cervical: No cervical adenopathy.   Skin:     General: Skin is warm and dry.      Coloration: Skin is not jaundiced.      Findings: No bruising or erythema.   Neurological:      General: No focal deficit present.      Mental Status: She is alert and oriented to person, place, and time. Mental status is at baseline.      Cranial Nerves: No cranial nerve deficit.      Motor: No weakness.      Coordination: Coordination normal.      Gait: Gait normal.   Psychiatric:         Mood and Affect: Mood normal.         Behavior: Behavior normal.         Thought Content: Thought content normal.         Judgment: Judgment normal.         Assessment/Plan   Problem List Items Addressed This Visit             ICD-10-CM       Health Encounters    Healthcare maintenance - Primary Z00.00     Other Visit Diagnoses         Codes    Well female exam with routine gynecological exam     Z01.419    Relevant Orders    Referral to Gynecology    Need for influenza vaccination     Z23    Relevant Orders    Flu vaccine, trivalent, preservative free, age 6 months and greater (Fluraix/Fluzone/Flulaval) (Completed)        It was a pleasure to see you today.  I would like to remind you about importance of a healthy lifestyle in order to improve your well-being and live longer.  Try to engage in physical activities for at least 150 minutes per week.  Eat about 10 servings of fruits and  vegetables daily. My advice is 2 servings of fruits and 8 servings of vegetables.  For vegetables choose at least half of them green and at least half of them fresh.  Please avoid sugar, salt, fried food and saturated fat.    F/up in 1 year or sooner if needed.     PAST SURGICAL HISTORY:  S/P arthroscopy of left shoulder     Status post left foot surgery

## 2024-12-12 ENCOUNTER — APPOINTMENT (OUTPATIENT)
Dept: OBSTETRICS AND GYNECOLOGY | Facility: CLINIC | Age: 24
End: 2024-12-12
Payer: COMMERCIAL

## 2024-12-12 VITALS
DIASTOLIC BLOOD PRESSURE: 60 MMHG | HEIGHT: 68 IN | SYSTOLIC BLOOD PRESSURE: 112 MMHG | BODY MASS INDEX: 31.52 KG/M2 | WEIGHT: 208 LBS

## 2024-12-12 DIAGNOSIS — Z01.419 WELL FEMALE EXAM WITH ROUTINE GYNECOLOGICAL EXAM: Primary | ICD-10-CM

## 2024-12-12 DIAGNOSIS — Z01.419 WELL WOMAN EXAM: ICD-10-CM

## 2024-12-12 PROBLEM — E78.2 MIXED HYPERLIPIDEMIA: Status: ACTIVE | Noted: 2023-04-05

## 2024-12-12 PROBLEM — M25.569 KNEE PAIN: Status: ACTIVE | Noted: 2023-02-25

## 2024-12-12 PROBLEM — S83.519A RUPTURE OF ANTERIOR CRUCIATE LIGAMENT OF KNEE: Status: ACTIVE | Noted: 2023-02-25

## 2024-12-12 PROCEDURE — 1036F TOBACCO NON-USER: CPT | Performed by: OBSTETRICS & GYNECOLOGY

## 2024-12-12 PROCEDURE — 99395 PREV VISIT EST AGE 18-39: CPT | Performed by: OBSTETRICS & GYNECOLOGY

## 2024-12-12 PROCEDURE — 3008F BODY MASS INDEX DOCD: CPT | Performed by: OBSTETRICS & GYNECOLOGY

## 2024-12-12 ASSESSMENT — PAIN SCALES - GENERAL: PAINLEVEL_OUTOF10: 0-NO PAIN

## 2024-12-12 NOTE — PROGRESS NOTES
"Violet Romanak is a 24 y.o. here for well woman visit.  HPI: no concerns.  GynHx: No concerns.  Pap Hx: [id number]  Social History     Substance and Sexual Activity   Sexual Activity Yes    Partners: Male    Birth control/protection: Condom Male     Current contraception: Condoms  STIs: none    Exercise: yes, boxing  Past med hx and past surg hx reviewed and notable for: healthy    Objective   /60   Ht 1.727 m (5' 8\")   Wt 94.3 kg (208 lb)   LMP 11/28/2024   BMI 31.63 kg/m²   Physical Exam  Vitals reviewed.   Constitutional:       Appearance: Normal appearance.   HENT:      Head: Normocephalic.   Neck:      Thyroid: No thyroid mass or thyromegaly.   Pulmonary:      Effort: Pulmonary effort is normal.   Chest:   Breasts:     Right: Normal.      Left: Normal.   Abdominal:      Palpations: Abdomen is soft.   Genitourinary:     General: Normal vulva.      Exam position: Lithotomy position.      Vagina: Normal.      Cervix: Normal.      Uterus: Normal.       Adnexa: Right adnexa normal and left adnexa normal.   Musculoskeletal:         General: Normal range of motion.      Cervical back: Normal range of motion and neck supple.   Lymphadenopathy:      Upper Body:      Right upper body: No supraclavicular or axillary adenopathy.      Left upper body: No supraclavicular or axillary adenopathy.   Skin:     General: Skin is warm and dry.   Neurological:      General: No focal deficit present.      Mental Status: She is alert.   Psychiatric:         Mood and Affect: Mood normal.         Behavior: Behavior normal.         Thought Content: Thought content normal.         Judgment: Judgment normal.        Assessment and Plan:  Problem List Items Addressed This Visit          Ob-Gyn Problems    Well female exam with routine gynecological exam - Primary    Well woman exam    Overview     Thank you for your visit for well woman care.  At your visit, you had a pap smear performed. The results will be available in [facility] in " two to three weeks.  You are happy with condoms for birth control.           No orders of the defined types were placed in this encounter.

## 2024-12-17 LAB
CYTOLOGY CMNT CVX/VAG CYTO-IMP: NORMAL
LAB AP HPV HR: NORMAL
LABORATORY COMMENT REPORT: NORMAL
LMP START DATE: NORMAL
PATH REPORT.TOTAL CANCER: NORMAL

## 2025-03-02 ENCOUNTER — OFFICE VISIT (OUTPATIENT)
Dept: URGENT CARE | Age: 25
End: 2025-03-02
Payer: COMMERCIAL

## 2025-03-02 VITALS
BODY MASS INDEX: 31.63 KG/M2 | WEIGHT: 208 LBS | HEART RATE: 70 BPM | SYSTOLIC BLOOD PRESSURE: 113 MMHG | DIASTOLIC BLOOD PRESSURE: 66 MMHG | TEMPERATURE: 98.1 F | OXYGEN SATURATION: 99 %

## 2025-03-02 DIAGNOSIS — L03.039 PARONYCHIA OF GREAT TOE: Primary | ICD-10-CM

## 2025-03-02 PROCEDURE — 99213 OFFICE O/P EST LOW 20 MIN: CPT | Performed by: FAMILY MEDICINE

## 2025-03-02 RX ORDER — CEPHALEXIN 500 MG/1
500 CAPSULE ORAL 2 TIMES DAILY
Qty: 10 CAPSULE | Refills: 0 | Status: SHIPPED | OUTPATIENT
Start: 2025-03-02 | End: 2025-03-07

## 2025-03-02 RX ORDER — MUPIROCIN 20 MG/G
OINTMENT TOPICAL 3 TIMES DAILY
Qty: 22 G | Refills: 0 | Status: SHIPPED | OUTPATIENT
Start: 2025-03-02 | End: 2025-03-09

## 2025-03-02 NOTE — PATIENT INSTRUCTIONS
Soak the area in warm antibacterial soapy water for 10-15 minutes, twice a day. Dry after    Apply the antibiotic ointment to the area    You will be started on antibiotic which will be sent to the pharmacy; please complete the regimen as directed even if your symptoms improve. It is recommended to take an Probiotic while on this medication to reduce symptoms of upset stomach, diarrhea, and in women, yeast infections.    Maintain your follow-up appoint with podiatry

## 2025-03-02 NOTE — PROGRESS NOTES
Subjective   Patient ID: Violet Romanak is a 24 y.o. female. They present today with a chief complaint of Other (Ingrown toe nail).    Patient disposition: Home    History of Present Illness  HPI  Right great toe medial pain for the past few days.  Has follow-up with podiatry in 1 week.  Patient is leaving for [place] ([place] in 2 weeks.  History of ingrown toenails in the past.  Has trimmed it.  Now becoming red.      Past Medical History  Allergies as of 03/02/2025 - Reviewed 03/02/2025   Allergen Reaction Noted    Vancomycin Anaphylaxis 04/05/2023       (Not in a hospital admission)       No current outpatient medications on file.     No current facility-administered medications for this visit.       Patient Active Problem List   Diagnosis    Injury of left lower extremity    Left foot pain    Knee pain    Popliteal pain    Rupture of anterior cruciate ligament of knee    UTI symptoms    Mixed hyperlipidemia    Obesity with body mass index 30 or greater    Class 1 obesity due to excess calories without serious comorbidity with body mass index (BMI) of 34.0 to 34.9 in adult    Loose body in knee    Chronic patellofemoral pain of left knee    Quadriceps weakness    Healthcare maintenance    Well woman exam    Well female exam with routine gynecological exam       Past Surgical History:   Procedure Laterality Date    KNEE ARTHROSCOPY W/ DEBRIDEMENT Left 12/18/2023    LEFT Knee Loose Body Removal, synovectomy w/ Dr. Hernandez    OTHER SURGICAL HISTORY  08/01/2020    [place] tooth extraction    OTHER SURGICAL HISTORY Left 2022    Anterior cruciate ligament Reconstruction w/ quad Tendon & allograft    TONSILLECTOMY  2005        reports that she has never smoked. She has never been exposed to tobacco smoke. She has never used smokeless tobacco. She reports current alcohol use of about 1.0 standard drink of alcohol per week. She reports that she does not use drugs.    Review of Systems  As noted in HPI. ROS otherwise negative unless  noted.       Objective    Vitals:    03/02/25 0837   BP: 113/66   Pulse: 70   Temp: 36.7 °C (98.1 °F)   TempSrc: Oral   SpO2: 99%   Weight: 94.3 kg (208 lb)     No LMP recorded.    Physical Exam  Constitutional: vital signs reviewed. Well developed, well nourished. patient alert and patient without distress.   Psych: Normal mood and affect  Skin: Normal skin color and pigmentation, normal skin turgor, and no rash.  Right great toe with minor proximal paronychia.  No pus pocket.  Small amount of drainage.  Nail is trimmed appropriately.  No tunneling of the nail.  No onychomycosis.  Does not warrant any trimming or removal of the nail  Cardiovascular: No edema in the extremities. Normal skin color/perfusion.   Pulmonary: Skin without cyanosis. Patient without respiratory distress. Speaking in full sentences.  Musculoskeletal: Normal gait and stance, balance and coordination without gross deficit.        Procedures    Point of Care Test & Imaging Results from this visit           Diagnostic study results (if any) were reviewed.    Assessment/Plan   Allergies, medications, history, and pertinent labs/EKGs/Imaging reviewed.    Medical Decision Making  See note    Orders and Diagnoses  There are no diagnoses linked to this encounter.    Medical Admin Record      Follow Up Instructions  No follow-ups on file.    At time of discharge patient was clinically well-appearing and HDS for outpatient management. The patient and/or family was educated regarding diagnosis, supportive care, OTC and Rx medications. The patient and/or family was given the opportunity to ask questions prior to discharge and all questions answered. They verbalized understanding of my discussion of the plans for treatment, expected course, indications to return to  or seek further evaluation in ED, and the need for timely follow up as directed.      Electronically signed by [facility]

## 2025-03-12 ENCOUNTER — APPOINTMENT (OUTPATIENT)
Dept: PODIATRY | Facility: CLINIC | Age: 25
End: 2025-03-12
Payer: COMMERCIAL

## 2025-03-12 DIAGNOSIS — M79.674 PAIN OF TOE OF RIGHT FOOT: Primary | ICD-10-CM

## 2025-03-12 DIAGNOSIS — L03.031 CELLULITIS OF TOE OF RIGHT FOOT: ICD-10-CM

## 2025-03-12 DIAGNOSIS — L03.039 PARONYCHIA OF GREAT TOE: ICD-10-CM

## 2025-03-12 PROCEDURE — 11730 AVULSION NAIL PLATE SIMPLE 1: CPT | Performed by: PODIATRIST

## 2025-03-12 PROCEDURE — 99203 OFFICE O/P NEW LOW 30 MIN: CPT | Performed by: PODIATRIST

## 2025-03-12 PROCEDURE — 1036F TOBACCO NON-USER: CPT | Performed by: PODIATRIST

## 2025-03-12 RX ORDER — CEPHALEXIN 500 MG/1
500 CAPSULE ORAL 2 TIMES DAILY
Qty: 10 CAPSULE | Refills: 0 | Status: SHIPPED | OUTPATIENT
Start: 2025-03-12 | End: 2025-03-17

## 2025-03-12 NOTE — PROGRESS NOTES
History Of Present Illness  Violet Romanak is a 24 y.o. female presenting today for evaluation of her right hallux.  She was seen in urgent care on 3/2/2025.  She was placed on oral Keflex and given topical Bactroban.  She ports overall she is improving but still has discomfort.  She is concerned as she is leaving for [employer].    PCP Sherice Carlson MD- PHD  Last visit 10/15/24     Past Medical History  She has no past medical history on file.    Surgical History  She has a past surgical history that includes Other surgical history (08/01/2020); Other surgical history (Left, 2022); Tonsillectomy (2005); and Knee arthroscopy w/ debridement (Left, 12/18/2023).     Social History  She reports that she has never smoked. She has never been exposed to tobacco smoke. She has never used smokeless tobacco. She reports current alcohol use of about 1.0 standard drink of alcohol per week. She reports that she does not use drugs.    Family History  Family History   Problem Relation Name Age of Onset    No Known Problems Mother      Lumbar disc disease Father      Hyperlipidemia Father      No Known Problems Sister      No Known Problems Brother      Breast cancer Paternal Grandmother          or was it cervical    No Known Problems Other FAM HX         Allergies  Vancomycin    Medications  Current Outpatient Medications   Medication Sig Dispense Refill    cephalexin (Keflex) 500 mg capsule Take 1 capsule (500 mg) by mouth 2 times a day for 5 days. 10 capsule 0     No current facility-administered medications for this visit.       Review of Systems    REVIEW OF SYSTEMS  GENERAL:  Negative for malaise, significant weight loss, fever      Objective:   Vasc: DP and PT pulses are palpable bilateral.  CFT is less than 3 seconds bilateral.  Skin temperature is warm to cool proximal to distal bilateral.      Neuro:  Light touch is intact to the foot bilateral.       Derm: Right hallux lateral nail border shows ingrowth.  There is localized  edema and localized erythema.  No purulence.    Ortho: There is pain on palpation to the right hallux lateral nail border  Patient ID: Violet Romanak is a 24 y.o. female.    Nail Removal    Date/Time: 3/12/2025 10:54 AM    Performed by: Orquidea Manriquez DPM  Authorized by: Orquidea Manriquez DPM    Consent:     Consent obtained:  Verbal    Consent given by:  Patient    Risks discussed:  Bleeding, incomplete removal, infection and pain    Alternatives discussed:  Alternative treatment  Universal protocol:     Procedure explained and questions answered to patient or proxy's satisfaction: yes      Patient identity confirmed:  Verbally with patient  Pre-procedure details:     Skin preparation:  Alcohol    Preparation: Patient was prepped and draped in the usual sterile fashion    Procedure details:     Location:  Foot    Foot location:  R big toe  Anesthesia:     Anesthesia method:  Local infiltration    Local anesthetic:  Lidocaine 2% w/o epi  Nail Removal:     Nail removed:  Partial    Nail side:  Lateral  Post-procedure details:     Dressing:  4x4 sterile gauze and antibiotic ointment    Procedure completion:  Tolerated well, no immediate complications  Comments:      Ingrown Consent  We discussed the procedure in detail.  We discussed all risk, benefits and complications of moving forward.  They understand risks include bleeding, infection, worsening pain, ongoing pain, disfigurement of the nail, abnormal growth of the nail, nail spicule, recurrence, loss of the nail and need for further procedures.  Patient verbally consents to all of the above today.    -The nail elevator was used to free the proximal nail fold from the nail plate below it.  -Then, the nail elevator was inserted under the nail plate of the ingrown part of the toenail to separate the nail plate from the nail bed. The nail elevator was sent all the way back to the nail matrix.  -Using the nail splitter, the toenail was cut to separate the ingrown  portion from the healthy portion.  -A curved hemostat was then advanced under the free portion of the nail, as far as possible to grab the entire nail. It was then clamped down on this free nail segment and twisted and turned until the entire  piece of toenail was removed. The entire piece was confirmed to be fully removed intact.  -Hemostasis was achieved through direct pressure.  Antibiotic ointment was applied.  A dressing was applied to the area. The patient tolerated the procedure well without complications. Follow-up visit scheduled       Assessment/Plan     Diagnoses and all orders for this visit:  Pain of toe of right foot  Paronychia of great toe  -     cephalexin (Keflex) 500 mg capsule; Take 1 capsule (500 mg) by mouth 2 times a day for 5 days.  Cellulitis of toe of right foot  Other orders  -     Nail Removal      Paronychia right great toe  The patient is acute ingrowth to the right hallux lateral nail border.  She has responded to oral Keflex and topical Bactroban but the area remains ingrown.  We did discuss partial nail avulsion for this reason.  Procedure formed without complication.  Postprocedure instructions were dispensed.  She does have a history of chronic ingrowth.  We did discuss future matrixectomy.  She will schedule this at her convenience.     2.  Cellulitis right hallux  Patient does have residual cellulitis to the hallux.  We did discuss repeat antibiotics as she is leaving the country.  Keflex called in today      Orquidea Manriquez DPM

## 2025-05-21 ENCOUNTER — OFFICE VISIT (OUTPATIENT)
Dept: URGENT CARE | Age: 25
End: 2025-05-21
Payer: COMMERCIAL

## 2025-05-21 VITALS
OXYGEN SATURATION: 99 % | BODY MASS INDEX: 29.47 KG/M2 | TEMPERATURE: 98.4 F | HEART RATE: 63 BPM | WEIGHT: 199 LBS | SYSTOLIC BLOOD PRESSURE: 142 MMHG | RESPIRATION RATE: 16 BRPM | HEIGHT: 69 IN | DIASTOLIC BLOOD PRESSURE: 100 MMHG

## 2025-05-21 DIAGNOSIS — Z11.3 SCREEN FOR STD (SEXUALLY TRANSMITTED DISEASE): Primary | ICD-10-CM

## 2025-05-21 ASSESSMENT — PAIN SCALES - GENERAL: PAINLEVEL_OUTOF10: 0-NO PAIN

## 2025-05-21 NOTE — PROGRESS NOTES
"Subjective   Patient ID: Violet Romanak is a 25 y.o. female. They present today with a chief complaint of STD testing (No symptoms).    Patient disposition: Home    History of Present Illness  HPI  Patient presents for STD screening.  Denies any exposure.  No symptoms.  Requesting blood work as well for HIV and syphilis.  Healthy typically otherwise.  No other complaints      Past Medical History  Allergies as of 05/21/2025 - Reviewed 05/21/2025   Allergen Reaction Noted    Vancomycin Anaphylaxis 04/05/2023       Prescriptions Prior to Admission[1]     Current Medications[2]    Problem List[3]    Surgical History[4]     reports that she has never smoked. She has never been exposed to tobacco smoke. She has never used smokeless tobacco. She reports current alcohol use of about 1.0 standard drink of alcohol per week. She reports that she does not use drugs.    Review of Systems  As noted in HPI. ROS otherwise negative unless noted.       Objective    Vitals:    05/21/25 1731   BP: (!) 142/100   Pulse: 63   Resp: 16   Temp: 36.9 °C (98.4 °F)   TempSrc: Oral   SpO2: 99%   Weight: 90.3 kg (199 lb)   Height: 1.753 m (5' 9\")     Patient's last menstrual period was 04/30/2025 (approximate).    Physical Exam  Constitutional: vital signs reviewed. Well developed, well nourished. patient alert and patient without distress.   Head and Face: Normal and atraumatic.      Cardiovascular: Heart rate normal, normal S1 and S2, no gallops, no murmurs and no pericardial rub. Rhythm: Normal.  Pulmonary: No respiratory distress. Palpation of chest: Normal. Clear bilateral breath sounds.   Abdomen: Soft nontender; no abdominal mass palpated. No organomegaly.  Negative flank tenderness on percussion  Skin: Normal skin color and pigmentation, normal skin turgor, and no rash.        Procedures    Point of Care Test & Imaging Results from this visit  Results for orders placed or performed in visit on 12/12/24   THINPREP PAP TEST (21-24)    " Collection Time: 12/12/24 10:10 AM   Result Value Ref Range    Case Report       Gynecologic Cytology                              Case: [id number]                                   Authorizing Provider:  Daria Gerardo MD          Collected:           12/12/2024 1010              Ordering Location:     [facility]  Received:            12/12/2024 1010              First Screen:          ROCKY Dueñas                                                         Rescreen:              ROCKY Borrero                                                              Specimen:    ThinPrep Liquid-Based Pap-Imaging System Screen, CERVIX, SCREENING                         Final Cytological Interpretation           A. THINPREP PAP CERVIX, SCREENING -     Specimen Adequacy  Satisfactory for evaluation; endocervical/transformation zone component is present  Quality Indicator: Partially obscuring inflammation    General Categorization  Negative for intraepithelial lesion or malignancy.    Descriptive Interpretation  Negative for intraepithelial lesion or malignancy                    Slide(s) initially screened by ROCKY Dueñas at [facility] [address] [facility] [id number]    QC review performed by ROCKY Borrero at [facility] [address] [place] [id number]  By the signature on this report, the individual or group listed as making the Final Interpretation/Diagnosis certifies that they have reviewed this case.       ThinPrep Imaging System       This specimen has been analyzed by the ThinPrep Imaging System ([employer] an automated imaging and review system, which assists the laboratory in evaluating cells on ThinPrep Pap tests. Following automated imaging, selected fields from every slide were reviewed by a cytotechnologist and/or pathologist.        Educational Note       Cervical cytology is a screening procedure primarily for squamous cancers and precursors and  has associated false-negative and false-positives results as evidenced by published data. Your patient's test should be interpreted in this context, together with the patient's history and clinical findings. Regular sampling and follow-up of unexplained clinical signs and symptoms are recommended to minimize false negative results.      Perform HPV HR test? Never     LMP 11/28/2024             Diagnostic study results (if any) were reviewed.  (If applicable) preliminary radiology reading: [none]    Assessment/Plan   Allergies, medications, history, and pertinent labs/EKGs/Imaging reviewed.        Medical Decision Making  See note    Orders and Diagnoses  There are no diagnoses linked to this encounter.    Medical Admin Record      Follow Up Instructions  No follow-ups on file.    [At time of discharge patient was clinically well-appearing and HDS for outpatient management. The patient and/or family was educated regarding diagnosis, supportive care, OTC and Rx medications. The patient and/or family was given the opportunity to ask questions prior to discharge and all questions answered. They verbalized understanding of my discussion of the plans for treatment, expected course, indications to return to  or seek further evaluation in ED, and the need for timely follow up as directed. ]      Electronically signed by [facility]           [1] (Not in a hospital admission)  [2]   No current outpatient medications on file.     No current facility-administered medications for this visit.   [3]   Patient Active Problem List  Diagnosis    Injury of left lower extremity    Left foot pain    Knee pain    Popliteal pain    Rupture of anterior cruciate ligament of knee    UTI symptoms    Mixed hyperlipidemia    Obesity with body mass index 30 or greater    Class 1 obesity due to excess calories without serious comorbidity with body mass index (BMI) of 34.0 to 34.9 in adult    Loose body in knee    Chronic  patellofemoral pain of left knee    Quadriceps weakness    Healthcare maintenance    Well woman exam    Well female exam with routine gynecological exam   [4]   Past Surgical History:  Procedure Laterality Date    KNEE ARTHROSCOPY W/ DEBRIDEMENT Left 12/18/2023    LEFT Knee Loose Body Removal, synovectomy w/ Dr. Hernandez    OTHER SURGICAL HISTORY  08/01/2020    [place] tooth extraction    OTHER SURGICAL HISTORY Left 2022    Anterior cruciate ligament Reconstruction w/ quad Tendon & allograft    TONSILLECTOMY  2005

## 2025-05-22 LAB
C TRACH RRNA SPEC QL NAA+PROBE: NOT DETECTED
N GONORRHOEA RRNA SPEC QL NAA+PROBE: NOT DETECTED
QUEST GC CT AMPLIFIED (ALWAYS MESSAGE): NORMAL
T VAGINALIS RRNA SPEC QL NAA+PROBE: NOT DETECTED

## 2025-07-16 ENCOUNTER — OFFICE VISIT (OUTPATIENT)
Dept: URGENT CARE | Age: 25
End: 2025-07-16
Payer: COMMERCIAL

## 2025-07-16 ENCOUNTER — APPOINTMENT (OUTPATIENT)
Dept: PODIATRY | Facility: CLINIC | Age: 25
End: 2025-07-16
Payer: COMMERCIAL

## 2025-07-16 VITALS
HEART RATE: 67 BPM | TEMPERATURE: 98 F | WEIGHT: 194 LBS | RESPIRATION RATE: 16 BRPM | HEIGHT: 69 IN | SYSTOLIC BLOOD PRESSURE: 121 MMHG | DIASTOLIC BLOOD PRESSURE: 87 MMHG | OXYGEN SATURATION: 99 % | BODY MASS INDEX: 28.73 KG/M2

## 2025-07-16 DIAGNOSIS — N90.89 ENLARGED CLITORIS: ICD-10-CM

## 2025-07-16 DIAGNOSIS — N76.6 ULCERATION, VULVA: Primary | ICD-10-CM

## 2025-07-16 DIAGNOSIS — Z11.3 SCREENING EXAMINATION FOR STI: ICD-10-CM

## 2025-07-16 LAB
POC APPEARANCE, URINE: CLEAR
POC BILIRUBIN, URINE: NEGATIVE
POC BLOOD, URINE: NEGATIVE
POC COLOR, URINE: YELLOW
POC GLUCOSE, URINE: NEGATIVE MG/DL
POC KETONES, URINE: NEGATIVE MG/DL
POC LEUKOCYTES, URINE: NEGATIVE
POC NITRITE,URINE: NEGATIVE
POC PH, URINE: 6 PH
POC PROTEIN, URINE: NEGATIVE MG/DL
POC SPECIFIC GRAVITY, URINE: 1.01
POC UROBILINOGEN, URINE: 0.2 EU/DL
PREGNANCY TEST URINE, POC: NEGATIVE

## 2025-07-16 NOTE — PROGRESS NOTES
Subjective   Patient ID: Violet Romanak is a 25 y.o. female. They present today with a chief complaint of No chief complaint on file..    History of Present Illness  Pt presents to the  with the c/o pain to the clitoris. Pt had protected sex on Saturday however did have oral sex. Pain to the clitoris started a few days ago. She is having no other symptoms at this time. No other associated symptoms or concerns to address at this time.           Past Medical History  Allergies as of 07/16/2025 - Reviewed 05/21/2025   Allergen Reaction Noted    Vancomycin Anaphylaxis 04/05/2023       Prescriptions Prior to Admission[1]     Medical History[2]    Surgical History[3]     reports that she has never smoked. She has never been exposed to tobacco smoke. She has never used smokeless tobacco. She reports current alcohol use of about 1.0 standard drink of alcohol per week. She reports that she does not use drugs.    Review of Systems  Review of Systems     10 point ROS completed and all are negative other than what is stated in the current HPI                            Objective    There were no vitals filed for this visit.  No LMP recorded.    Physical Exam  Vitals and nursing note reviewed.   Constitutional:       Appearance: Normal appearance. She is not ill-appearing or toxic-appearing.   HENT:      Head: Normocephalic.      Mouth/Throat:      Mouth: Mucous membranes are moist.      Pharynx: No oropharyngeal exudate or posterior oropharyngeal erythema.      Comments: No abnormal findings    Cardiovascular:      Rate and Rhythm: Normal rate and regular rhythm.   Pulmonary:      Effort: Pulmonary effort is normal.      Breath sounds: Normal breath sounds.   Abdominal:      Palpations: Abdomen is soft.      Tenderness: There is no abdominal tenderness.   Genitourinary:     Exam position: Supine.      Vagina: No vaginal discharge.      Rectum: Normal.        Comments: Enlarged erythematous Clitoris  Ulceration between  clitoris and clitoral polanco left side    Skin:     General: Skin is warm and dry.      Capillary Refill: Capillary refill takes less than 2 seconds.      Findings: No rash.     Neurological:      Mental Status: She is alert and oriented to person, place, and time.     Psychiatric:         Behavior: Behavior normal.         Procedures    Point of Care Test & Imaging Results from this visit  No results found for this visit on 07/16/25.   Imaging  No results found.    Cardiology, Vascular, and Other Imaging  No other imaging results found for the past 2 days      Diagnostic study results (if any) were reviewed by WILY Jiménez.    Assessment/Plan   Allergies, medications, history, and pertinent labs/EKGs/Imaging reviewed by WILY Jiménez.     Medical Decision Making  Clitoral Pain/Ulceration:  - Cool compress to the area and over the counter antibiotic ointment to the area  - Cultures have been collected and will be sent  - UA was normal  - Will contact with any positive results  - Encourage follow-up with OBGYN for re-evaluation    At time of discharge patient was clinically well-appearing and HDS for outpatient management. The patient and/or family was educated regarding diagnosis, supportive care, OTC and Rx medications. The patient and/or family was given the opportunity to ask questions prior to discharge.  They verbalized understanding of my discussion of the plans for treatment, expected course, indications to return to  or seek further evaluation in ED, and the need for timely follow up as directed.   They were provided with a work/school excuse if requested.  AVS provided to patient.  All questions were answered and the patient verbalized understanding of the plan of care for today.         Orders and Diagnoses  There are no diagnoses linked to this encounter.    Medical Admin Record      Patient disposition: Home    Electronically signed by WILY Jiménez  5:08  PM           [1] (Not in a hospital admission)  [2] No past medical history on file.  [3]   Past Surgical History:  Procedure Laterality Date    KNEE ARTHROSCOPY W/ DEBRIDEMENT Left 12/18/2023    LEFT Knee Loose Body Removal, synovectomy w/ Dr. Hernandez    OTHER SURGICAL HISTORY  08/01/2020    [place] tooth extraction    OTHER SURGICAL HISTORY Left 2022    Anterior cruciate ligament Reconstruction w/ quad Tendon & allograft    TONSILLECTOMY  2005

## 2025-07-16 NOTE — PATIENT INSTRUCTIONS
Clitoral Pain/Ulceration:  - Cool compress to the area and over the counter antibiotic ointment to the area  - Cultures have been collected and will be sent  - UA was normal  - Will contact with any positive results  - Encourage follow-up with OBGYN for re-evaluation

## 2025-07-19 LAB
HSV1 DNA SPEC QL NAA+PROBE: NOT DETECTED
HSV2 DNA SPEC QL NAA+PROBE: NOT DETECTED
SPECIMEN SOURCE: NORMAL

## 2025-08-20 ENCOUNTER — APPOINTMENT (OUTPATIENT)
Dept: PODIATRY | Facility: CLINIC | Age: 25
End: 2025-08-20
Payer: COMMERCIAL

## 2025-09-03 ENCOUNTER — APPOINTMENT (OUTPATIENT)
Dept: PODIATRY | Facility: CLINIC | Age: 25
End: 2025-09-03
Payer: COMMERCIAL

## 2025-10-14 ENCOUNTER — APPOINTMENT (OUTPATIENT)
Dept: PRIMARY CARE | Facility: CLINIC | Age: 25
End: 2025-10-14
Payer: COMMERCIAL

## 2025-10-16 ENCOUNTER — APPOINTMENT (OUTPATIENT)
Dept: PRIMARY CARE | Facility: CLINIC | Age: 25
End: 2025-10-16
Payer: COMMERCIAL

## (undated) DEVICE — GOWN, SURGICAL, ROYAL SILK, XL, STERILE

## (undated) DEVICE — STRIP, SKIN CLOSURE, STERI STRIP, REINFORCED, 0.5 X 4 IN

## (undated) DEVICE — TUBING, PATIENT 8FT STERILE

## (undated) DEVICE — Device

## (undated) DEVICE — GLOVE, SURGICAL, PROTEXIS PI , 7.5, PF, LF

## (undated) DEVICE — BOWL, BASIN, 32 OZ, STERILE

## (undated) DEVICE — TUBING, PUMP REDEUCE 8FT STERILE

## (undated) DEVICE — MANIFOLD, 4 PORT NEPTUNE STANDARD

## (undated) DEVICE — PREP, IODOPHOR, W/ALCOHOL, DURAPREP, W/APPLICATOR, 26 CC

## (undated) DEVICE — GLOVE, SURGICAL, PROTEXIS PI BLUE W/NEUTHERA, 8.0, PF, LF

## (undated) DEVICE — STRAP, ARM BOARD, 32 X 1.5

## (undated) DEVICE — STRAP, VELCRO, BODY, 4 X 60IN, NS

## (undated) DEVICE — GOWN, SURGICAL, ROYAL SILK, LG, STERILE

## (undated) DEVICE — TUBING, SUCTION, 6MM X 10, CLEAN N-COND